# Patient Record
Sex: MALE | Race: BLACK OR AFRICAN AMERICAN | NOT HISPANIC OR LATINO | URBAN - METROPOLITAN AREA
[De-identification: names, ages, dates, MRNs, and addresses within clinical notes are randomized per-mention and may not be internally consistent; named-entity substitution may affect disease eponyms.]

---

## 2024-08-22 ENCOUNTER — HOSPITAL ENCOUNTER (EMERGENCY)
Facility: HOSPITAL | Age: 44
Discharge: HOME | End: 2024-08-22
Attending: EMERGENCY MEDICINE
Payer: COMMERCIAL

## 2024-08-22 VITALS
TEMPERATURE: 98 F | WEIGHT: 220 LBS | HEART RATE: 88 BPM | HEIGHT: 70 IN | OXYGEN SATURATION: 95 % | RESPIRATION RATE: 20 BRPM | SYSTOLIC BLOOD PRESSURE: 149 MMHG | BODY MASS INDEX: 31.5 KG/M2 | DIASTOLIC BLOOD PRESSURE: 91 MMHG

## 2024-08-22 DIAGNOSIS — R73.9 HYPERGLYCEMIA: ICD-10-CM

## 2024-08-22 DIAGNOSIS — E13.65 UNCONTROLLED OTHER SPECIFIED DIABETES MELLITUS WITH HYPERGLYCEMIA: Primary | ICD-10-CM

## 2024-08-22 LAB
ALBUMIN SERPL BCP-MCNC: 3.5 G/DL (ref 3.5–5.2)
ALLENS TEST: ABNORMAL
ALP SERPL-CCNC: 85 U/L (ref 55–135)
ALT SERPL W/O P-5'-P-CCNC: 41 U/L (ref 10–44)
ANION GAP SERPL CALC-SCNC: 10 MMOL/L (ref 8–16)
AST SERPL-CCNC: 18 U/L (ref 10–40)
B-OH-BUTYR BLD STRIP-SCNC: 0.1 MMOL/L (ref 0–0.5)
BACTERIA #/AREA URNS AUTO: NORMAL /HPF
BASOPHILS # BLD AUTO: 0.02 K/UL (ref 0–0.2)
BASOPHILS NFR BLD: 0.3 % (ref 0–1.9)
BILIRUB SERPL-MCNC: 0.7 MG/DL (ref 0.1–1)
BILIRUB UR QL STRIP: NEGATIVE
BUN SERPL-MCNC: 11 MG/DL (ref 6–20)
BUN SERPL-MCNC: 11 MG/DL (ref 6–30)
CALCIUM SERPL-MCNC: 8.8 MG/DL (ref 8.7–10.5)
CHLORIDE SERPL-SCNC: 101 MMOL/L (ref 95–110)
CHLORIDE SERPL-SCNC: 101 MMOL/L (ref 95–110)
CLARITY UR REFRACT.AUTO: CLEAR
CO2 SERPL-SCNC: 21 MMOL/L (ref 23–29)
COLOR UR AUTO: COLORLESS
CREAT SERPL-MCNC: 0.8 MG/DL (ref 0.5–1.4)
CREAT SERPL-MCNC: 1 MG/DL (ref 0.5–1.4)
DIFFERENTIAL METHOD BLD: ABNORMAL
EOSINOPHIL # BLD AUTO: 0.1 K/UL (ref 0–0.5)
EOSINOPHIL NFR BLD: 0.7 % (ref 0–8)
ERYTHROCYTE [DISTWIDTH] IN BLOOD BY AUTOMATED COUNT: 11.9 % (ref 11.5–14.5)
EST. GFR  (NO RACE VARIABLE): >60 ML/MIN/1.73 M^2
GLUCOSE SERPL-MCNC: 377 MG/DL (ref 70–110)
GLUCOSE SERPL-MCNC: 383 MG/DL (ref 70–110)
GLUCOSE UR QL STRIP: ABNORMAL
HCO3 UR-SCNC: 26.1 MMOL/L (ref 24–28)
HCT VFR BLD AUTO: 41.2 % (ref 40–54)
HCT VFR BLD CALC: 42 %PCV (ref 36–54)
HCV AB SERPL QL IA: NORMAL
HGB BLD-MCNC: 13.6 G/DL (ref 14–18)
HGB UR QL STRIP: NEGATIVE
HIV 1+2 AB+HIV1 P24 AG SERPL QL IA: NORMAL
IMM GRANULOCYTES # BLD AUTO: 0.02 K/UL (ref 0–0.04)
IMM GRANULOCYTES NFR BLD AUTO: 0.3 % (ref 0–0.5)
KETONES UR QL STRIP: ABNORMAL
LEUKOCYTE ESTERASE UR QL STRIP: NEGATIVE
LYMPHOCYTES # BLD AUTO: 3.1 K/UL (ref 1–4.8)
LYMPHOCYTES NFR BLD: 40.3 % (ref 18–48)
MCH RBC QN AUTO: 29.6 PG (ref 27–31)
MCHC RBC AUTO-ENTMCNC: 33 G/DL (ref 32–36)
MCV RBC AUTO: 90 FL (ref 82–98)
MICROSCOPIC COMMENT: NORMAL
MONOCYTES # BLD AUTO: 0.6 K/UL (ref 0.3–1)
MONOCYTES NFR BLD: 7.3 % (ref 4–15)
NEUTROPHILS # BLD AUTO: 3.9 K/UL (ref 1.8–7.7)
NEUTROPHILS NFR BLD: 51.1 % (ref 38–73)
NITRITE UR QL STRIP: NEGATIVE
NRBC BLD-RTO: 0 /100 WBC
OHS QRS DURATION: 92 MS
OHS QTC CALCULATION: 440 MS
PCO2 BLDA: 45.3 MMHG (ref 35–45)
PH SMN: 7.37 [PH] (ref 7.35–7.45)
PH UR STRIP: 6 [PH] (ref 5–8)
PLATELET # BLD AUTO: 276 K/UL (ref 150–450)
PMV BLD AUTO: 10.8 FL (ref 9.2–12.9)
PO2 BLDA: 54 MMHG (ref 40–60)
POC BE: 1 MMOL/L
POC IONIZED CALCIUM: 1.21 MMOL/L (ref 1.06–1.42)
POC SATURATED O2: 86 % (ref 95–100)
POC TCO2 (MEASURED): 25 MMOL/L (ref 23–29)
POC TCO2: 27 MMOL/L (ref 24–29)
POCT GLUCOSE: 238 MG/DL (ref 70–110)
POTASSIUM BLD-SCNC: 3.9 MMOL/L (ref 3.5–5.1)
POTASSIUM SERPL-SCNC: 3.8 MMOL/L (ref 3.5–5.1)
PROT SERPL-MCNC: 6.8 G/DL (ref 6–8.4)
PROT UR QL STRIP: NEGATIVE
RBC # BLD AUTO: 4.6 M/UL (ref 4.6–6.2)
SAMPLE: ABNORMAL
SAMPLE: ABNORMAL
SITE: ABNORMAL
SODIUM BLD-SCNC: 134 MMOL/L (ref 136–145)
SODIUM SERPL-SCNC: 132 MMOL/L (ref 136–145)
SP GR UR STRIP: 1.01 (ref 1–1.03)
URN SPEC COLLECT METH UR: ABNORMAL
WBC # BLD AUTO: 7.69 K/UL (ref 3.9–12.7)
YEAST UR QL AUTO: NORMAL

## 2024-08-22 PROCEDURE — 99900035 HC TECH TIME PER 15 MIN (STAT)

## 2024-08-22 PROCEDURE — 93005 ELECTROCARDIOGRAM TRACING: CPT

## 2024-08-22 PROCEDURE — 25000003 PHARM REV CODE 250: Performed by: EMERGENCY MEDICINE

## 2024-08-22 PROCEDURE — 80053 COMPREHEN METABOLIC PANEL: CPT | Performed by: EMERGENCY MEDICINE

## 2024-08-22 PROCEDURE — 81001 URINALYSIS AUTO W/SCOPE: CPT | Performed by: EMERGENCY MEDICINE

## 2024-08-22 PROCEDURE — 87389 HIV-1 AG W/HIV-1&-2 AB AG IA: CPT | Performed by: PHYSICIAN ASSISTANT

## 2024-08-22 PROCEDURE — 99285 EMERGENCY DEPT VISIT HI MDM: CPT | Mod: 25

## 2024-08-22 PROCEDURE — 94761 N-INVAS EAR/PLS OXIMETRY MLT: CPT | Mod: XB

## 2024-08-22 PROCEDURE — 96360 HYDRATION IV INFUSION INIT: CPT

## 2024-08-22 PROCEDURE — 82803 BLOOD GASES ANY COMBINATION: CPT

## 2024-08-22 PROCEDURE — 80047 BASIC METABLC PNL IONIZED CA: CPT

## 2024-08-22 PROCEDURE — 85025 COMPLETE CBC W/AUTO DIFF WBC: CPT | Performed by: EMERGENCY MEDICINE

## 2024-08-22 PROCEDURE — 93010 ELECTROCARDIOGRAM REPORT: CPT | Mod: ,,, | Performed by: INTERNAL MEDICINE

## 2024-08-22 PROCEDURE — 82962 GLUCOSE BLOOD TEST: CPT

## 2024-08-22 PROCEDURE — 86803 HEPATITIS C AB TEST: CPT | Performed by: PHYSICIAN ASSISTANT

## 2024-08-22 PROCEDURE — 82010 KETONE BODYS QUAN: CPT | Performed by: EMERGENCY MEDICINE

## 2024-08-22 RX ADMIN — SODIUM CHLORIDE 1000 ML: 9 INJECTION, SOLUTION INTRAVENOUS at 09:08

## 2024-08-22 NOTE — ED NOTES
Assumed care of the patient. Report received from SOLANGE Horn. Pt on continuous cardiac monitoring, continuous pulse oximetry, and automatic BP cuff cycling Q60min. Pt in hospital gown, side rails up X2, bed low and locked, and call light is placed within reach. One family/visitors at bedside at this time. Pt denies any complaints or needs.          LOC: The patient is awake, alert and aware of environment with an appropriate affect, the patient is oriented x 3 and speaking appropriately.   APPEARANCE: Patient appears comfortable and in no acute distress, patient is clean and well groomed.  SKIN: The skin is warm and dry, color consistent with ethnicity.   MUSCULOSKELETAL: Patient moving all extremities spontaneously, no swelling noted.  RESPIRATORY: Airway is open and patent, respirations are spontaneous, patient has a normal effort and rate, no accessory muscle use noted.  CARDIAC: Patient has a normal rate and regular rhythm, no edema noted, capillary refill < 3 seconds.   GASTRO: Soft and non tender to palpation, no distention noted.   : Pt denies any pain or frequency with urination.  NEURO: Pt opens eyes spontaneously, behavior appropriate to situation, follows commands.

## 2024-08-22 NOTE — ED NOTES
"Patient identifiers for Jacques Banks 43 y.o. male checked and correct.  Chief Complaint   Patient presents with    Hyperglycemia     EMS reports "feeling bad" x 2 weeks/ elevated glucose >500mg/dl     History reviewed. No pertinent past medical history.  Allergies reported: Review of patient's allergies indicates:  Not on File      LOC: Patient is awake, alert, and aware of environment with an appropriate affect. Patient is oriented x 4 and speaking appropriately.  APPEARANCE: Patient resting comfortably and in no acute distress. Patient is clean and well groomed, patient's clothing is properly fastened.  HEENT: WDL  SKIN: The skin is warm and dry. Patient has normal skin turgor and moist mucus membranes.   MUSCULOSKELETAL: Patient is moving all extremities well, no obvious deformities noted. Pulses intact.   RESPIRATORY: Airway is open and patent. Respirations are spontaneous and non-labored with normal effort and rate.  CARDIAC: Patient has a normal rate and rhythm. 85 on cardiac monitor. No peripheral edema noted. Denies chest pain and SOB at at time of assessment.   ABDOMEN: No distention noted. Soft and non-tender upon palpation.  NEUROLOGICAL: pupils 2mm, PERRL. Facial expression is symmetrical. Hand grasps are equal bilaterally. Normal sensation in all extremities when touched with finger.          "

## 2024-08-22 NOTE — Clinical Note
"Jacques "Jacques" Jocelyn was seen and treated in our emergency department on 8/22/2024.  He may return to work on 08/23/2024.       If you have any questions or concerns, please don't hesitate to call.      Vesta Perry MD"

## 2024-08-22 NOTE — ED NOTES
I-STAT Chem-8+ Results:   Value Reference Range   Sodium 134 136-145 mmol/L   Potassium  3.9 3.5-5.1 mmol/L   Chloride 101  mmol/L   Ionized Calcium 1.21 1.06-1.42 mmol/L   CO2 (measured) 25 23-29 mmol/L   Glucose 383  mg/dL   BUN 11 6-30 mg/dL   Creatinine 0.8 0.5-1.4 mg/dL   Hematocrit 42 36-54%

## 2024-08-27 NOTE — ED PROVIDER NOTES
"History:  Jacques Banks is a 43 y.o. male who presents to the ED with Hyperglycemia (EMS reports "feeling bad" x 2 weeks/ elevated glucose >500mg/dl)    Described as 43-year-old male with a history of diabetes on insulin presenting to the emergency department with hyperglycemia.  He reports his blood sugar has been running high with increased thirst and poly urea over the past couple of weeks.  He was told to come to the emergency department as they were concerned he may have DKA.  He denies any nausea or vomiting.  He does report his diet has been poor recently.    Review of Systems: All systems reviewed and are negative except as per history of present illness.    Medications: There are no discharge medications for this patient.      PMH: History reviewed. No pertinent past medical history.  PSH: History reviewed. No pertinent surgical history.  Allergies: He has no allergies on file.  Social History: Marital Status: unknown. He  reports that he has been smoking cigarettes. He has never used smokeless tobacco.. He  has no history on file for alcohol use..       Exam:  VITAL SIGNS:   Vitals:    08/22/24 1017 08/22/24 1105 08/22/24 1122 08/22/24 1202   BP:  (!) 146/88  (!) 149/91   BP Location:  Right arm     Patient Position:  Lying     Pulse: 87 86 87 88   Resp: 18 20 15 20   Temp:   97.5 °F (36.4 °C)    TempSrc:   Oral    SpO2: 96% 95% 96% 95%   Weight:       Height:         Const: Awake and alert, NAD   Head: Atraumatic  Eyes: Normal Conjunctiva  ENT: Normal External Ears, Nose and Mouth.  Neck: Full range of motion. No meningismus.  Resp: Normal respiratory effort, No distress, CTAB  Cardio: Equal and intact distal pulses, RRR  Abd: Soft, non tender, non distended.   Skin: No petechiae or rashes  Ext: No cyanosis, or edema  Neur: Awake and alert  Psych: Normal Mood and Affect    Data:  Results for orders placed or performed during the hospital encounter of 08/22/24   HIV 1/2 Ag/Ab (4th Gen)   Result Value Ref " Range    HIV 1/2 Ag/Ab Non-reactive Non-reactive   Hepatitis C Antibody   Result Value Ref Range    Hepatitis C Ab Non-reactive Non-reactive   CBC auto differential   Result Value Ref Range    WBC 7.69 3.90 - 12.70 K/uL    RBC 4.60 4.60 - 6.20 M/uL    Hemoglobin 13.6 (L) 14.0 - 18.0 g/dL    Hematocrit 41.2 40.0 - 54.0 %    MCV 90 82 - 98 fL    MCH 29.6 27.0 - 31.0 pg    MCHC 33.0 32.0 - 36.0 g/dL    RDW 11.9 11.5 - 14.5 %    Platelets 276 150 - 450 K/uL    MPV 10.8 9.2 - 12.9 fL    Immature Granulocytes 0.3 0.0 - 0.5 %    Gran # (ANC) 3.9 1.8 - 7.7 K/uL    Immature Grans (Abs) 0.02 0.00 - 0.04 K/uL    Lymph # 3.1 1.0 - 4.8 K/uL    Mono # 0.6 0.3 - 1.0 K/uL    Eos # 0.1 0.0 - 0.5 K/uL    Baso # 0.02 0.00 - 0.20 K/uL    nRBC 0 0 /100 WBC    Gran % 51.1 38.0 - 73.0 %    Lymph % 40.3 18.0 - 48.0 %    Mono % 7.3 4.0 - 15.0 %    Eosinophil % 0.7 0.0 - 8.0 %    Basophil % 0.3 0.0 - 1.9 %    Differential Method Automated    Comprehensive metabolic panel   Result Value Ref Range    Sodium 132 (L) 136 - 145 mmol/L    Potassium 3.8 3.5 - 5.1 mmol/L    Chloride 101 95 - 110 mmol/L    CO2 21 (L) 23 - 29 mmol/L    Glucose 377 (H) 70 - 110 mg/dL    BUN 11 6 - 20 mg/dL    Creatinine 1.0 0.5 - 1.4 mg/dL    Calcium 8.8 8.7 - 10.5 mg/dL    Total Protein 6.8 6.0 - 8.4 g/dL    Albumin 3.5 3.5 - 5.2 g/dL    Total Bilirubin 0.7 0.1 - 1.0 mg/dL    Alkaline Phosphatase 85 55 - 135 U/L    AST 18 10 - 40 U/L    ALT 41 10 - 44 U/L    eGFR >60.0 >60 mL/min/1.73 m^2    Anion Gap 10 8 - 16 mmol/L   Beta - Hydroxybutyrate, Serum   Result Value Ref Range    Beta-Hydroxybutyrate 0.1 0.0 - 0.5 mmol/L   Urinalysis, Reflex to Urine Culture Urine, Clean Catch    Specimen: Urine   Result Value Ref Range    Specimen UA Urine, Clean Catch     Color, UA Colorless (A) Yellow, Straw, Tatiana    Appearance, UA Clear Clear    pH, UA 6.0 5.0 - 8.0    Specific Gravity, UA 1.010 1.005 - 1.030    Protein, UA Negative Negative    Glucose, UA 4+ (A) Negative     Ketones, UA 1+ (A) Negative    Bilirubin (UA) Negative Negative    Occult Blood UA Negative Negative    Nitrite, UA Negative Negative    Leukocytes, UA Negative Negative   Urinalysis Microscopic   Result Value Ref Range    Bacteria None None-Occ /hpf    Yeast, UA None None    Microscopic Comment SEE COMMENT    EKG 12-lead   Result Value Ref Range    QRS Duration 92 ms    [facility] QTC Calculation 440 ms   ISTAT PROCEDURE   Result Value Ref Range    POC PH 7.368 7.35 - 7.45    POC PCO2 45.3 (H) 35 - 45 mmHg    POC PO2 54 40 - 60 mmHg    POC HCO3 26.1 24 - 28 mmol/L    POC BE 1 -2 to 2 mmol/L    POC SATURATED O2 86 95 - 100 %    POC TCO2 27 24 - 29 mmol/L    Sample VENOUS     Site Other     Allens Test N/A    ISTAT PROCEDURE   Result Value Ref Range    POC Glucose 383 (H) 70 - 110 mg/dL    POC BUN 11 6 - 30 mg/dL    POC Creatinine 0.8 0.5 - 1.4 mg/dL    POC Sodium 134 (L) 136 - 145 mmol/L    POC Potassium 3.9 3.5 - 5.1 mmol/L    POC Chloride 101 95 - 110 mmol/L    POC TCO2 (MEASURED) 25 23 - 29 mmol/L    POC Ionized Calcium 1.21 1.06 - 1.42 mmol/L    POC Hematocrit 42 36 - 54 %PCV    Sample SCOTT    POCT glucose   Result Value Ref Range    POCT Glucose 238 (H) 70 - 110 mg/dL     Imaging Results              X-Ray Chest AP Portable (Final result)  Result time 08/22/24 10:23:41      Final result by Haseeb Jamison MD (08/22/24 10:23:41)                   Impression:      No significant intrathoracic abnormality.      Electronically signed by: Haseeb Jamison MD  Date:    08/22/2024  Time:    10:23               Narrative:    EXAMINATION:  XR CHEST AP PORTABLE    CLINICAL HISTORY:  hyperglycemia;    TECHNIQUE:  One view    COMPARISON:  No prior chest radiographs are currently available for comparison purposes.  Clinical information obtained from the electronic medical record indicates hyperglycemia.    FINDINGS:  Heart size is normal, as is the appearance of the pulmonary vascularity.  Lung zones are clear, and are free of significant  airspace consolidation or volume loss.  No pleural fluid.  No hilar or mediastinal mass lesion.  No pneumothorax.                                    12-LEAD EKG INTERPRETATION BY ME:  Rate/Rhythm: Normal Sinus Rhythm with rate of 90 beats per minute  QRS, ST, T-waves: Nonspecific St changes inferior leads  Impression:  Nsr, nonspecific St changes    Labs & Imaging studies were reviewed independently by me.     Medical Decision Makin-year-old male presenting to the emergency department with concern for hyperglycemia.  Blood sugar was elevated 377, no signs of DKA.  He was given 1 L of fluids with improvement in glucose to 238.  Workup was otherwise unremarkable, no signs of infection.  He was encouraged to eat a diabetic diet, referred to Endocrinology and diabetic education, continue insulin as prescribed.  Strict return precautions were discussed, and patient and partner are agreeable with the plan.    Clinical Impression:  1. Uncontrolled other specified diabetes mellitus with hyperglycemia    2. Hyperglycemia             Medication List      You have not been prescribed any medications.         Follow-up Information       [facility].    Specialty: Endocrinology  Contact information:  [address] [facility] [address] [phone]  Additional information:  [facility] - Main [facility], 6th Floor   Please park in [facility] and take Clinic elevator             [facility] [address]    Specialty: Diabetes  Contact information:  [address] [facility] [address] [phone]  Additional information:  Endocrinology & Diabetes Specialty Clinic - Main Building, Clinic 6th Floor   Please park in [facility] and take Clinic elevator                             Vesta Perry MD  [phone]

## 2024-09-03 RX ORDER — INSULIN LISPRO 100 [IU]/ML
5 INJECTION, SOLUTION INTRAVENOUS; SUBCUTANEOUS
COMMUNITY
Start: 2024-08-15

## 2024-09-03 RX ORDER — ATORVASTATIN CALCIUM 80 MG/1
1 TABLET, FILM COATED ORAL DAILY
COMMUNITY
Start: 2023-11-30

## 2024-09-03 RX ORDER — INSULIN GLARGINE 100 [IU]/ML
15 INJECTION, SOLUTION SUBCUTANEOUS NIGHTLY
COMMUNITY
Start: 2024-08-15

## 2024-09-03 RX ORDER — PEN NEEDLE, DIABETIC 31 GX5/16"
1 NEEDLE, DISPOSABLE MISCELLANEOUS 4 TIMES DAILY
COMMUNITY
Start: 2024-04-08

## 2024-09-03 RX ORDER — LISINOPRIL 40 MG/1
40 TABLET ORAL DAILY
COMMUNITY

## 2024-09-04 ENCOUNTER — LAB VISIT (OUTPATIENT)
Dept: LAB | Facility: HOSPITAL | Age: 44
End: 2024-09-04
Attending: INTERNAL MEDICINE
Payer: COMMERCIAL

## 2024-09-04 ENCOUNTER — OFFICE VISIT (OUTPATIENT)
Dept: ENDOCRINOLOGY | Facility: CLINIC | Age: 44
End: 2024-09-04
Payer: COMMERCIAL

## 2024-09-04 VITALS
HEART RATE: 85 BPM | BODY MASS INDEX: 29.81 KG/M2 | HEIGHT: 70 IN | DIASTOLIC BLOOD PRESSURE: 80 MMHG | OXYGEN SATURATION: 97 % | WEIGHT: 208.25 LBS | SYSTOLIC BLOOD PRESSURE: 130 MMHG

## 2024-09-04 DIAGNOSIS — R73.9 HYPERGLYCEMIA: ICD-10-CM

## 2024-09-04 DIAGNOSIS — E13.9 DIABETES 1.5, MANAGED AS TYPE 1: ICD-10-CM

## 2024-09-04 DIAGNOSIS — E10.65 TYPE 1 DIABETES MELLITUS WITH HYPERGLYCEMIA: Primary | ICD-10-CM

## 2024-09-04 DIAGNOSIS — I10 HYPERTENSION, UNSPECIFIED TYPE: ICD-10-CM

## 2024-09-04 DIAGNOSIS — E10.65 TYPE 1 DIABETES MELLITUS WITH HYPERGLYCEMIA: ICD-10-CM

## 2024-09-04 DIAGNOSIS — E78.5 HYPERLIPIDEMIA, UNSPECIFIED HYPERLIPIDEMIA TYPE: ICD-10-CM

## 2024-09-04 LAB
ALBUMIN SERPL BCP-MCNC: 3.8 G/DL (ref 3.5–5.2)
ALP SERPL-CCNC: 82 U/L (ref 55–135)
ALT SERPL W/O P-5'-P-CCNC: 28 U/L (ref 10–44)
ANION GAP SERPL CALC-SCNC: 11 MMOL/L (ref 8–16)
AST SERPL-CCNC: 15 U/L (ref 10–40)
BILIRUB SERPL-MCNC: 1.4 MG/DL (ref 0.1–1)
BUN SERPL-MCNC: 13 MG/DL (ref 6–20)
C PEPTIDE SERPL-MCNC: 1.81 NG/ML (ref 0.78–5.19)
CALCIUM SERPL-MCNC: 9.6 MG/DL (ref 8.7–10.5)
CHLORIDE SERPL-SCNC: 103 MMOL/L (ref 95–110)
CO2 SERPL-SCNC: 23 MMOL/L (ref 23–29)
CREAT SERPL-MCNC: 1 MG/DL (ref 0.5–1.4)
EST. GFR  (NO RACE VARIABLE): >60 ML/MIN/1.73 M^2
ESTIMATED AVG GLUCOSE: 258 MG/DL (ref 68–131)
GLUCOSE SERPL-MCNC: 213 MG/DL (ref 70–110)
HBA1C MFR BLD: 10.6 % (ref 4–5.6)
POTASSIUM SERPL-SCNC: 3.6 MMOL/L (ref 3.5–5.1)
PROT SERPL-MCNC: 7.3 G/DL (ref 6–8.4)
SODIUM SERPL-SCNC: 137 MMOL/L (ref 136–145)

## 2024-09-04 PROCEDURE — 36415 COLL VENOUS BLD VENIPUNCTURE: CPT | Performed by: INTERNAL MEDICINE

## 2024-09-04 PROCEDURE — 86341 ISLET CELL ANTIBODY: CPT | Performed by: INTERNAL MEDICINE

## 2024-09-04 PROCEDURE — 80053 COMPREHEN METABOLIC PANEL: CPT | Performed by: INTERNAL MEDICINE

## 2024-09-04 PROCEDURE — 84681 ASSAY OF C-PEPTIDE: CPT | Performed by: INTERNAL MEDICINE

## 2024-09-04 PROCEDURE — 99999 PR PBB SHADOW E&M-EST. PATIENT-LVL IV: CPT | Mod: PBBFAC,,, | Performed by: INTERNAL MEDICINE

## 2024-09-04 PROCEDURE — 83036 HEMOGLOBIN GLYCOSYLATED A1C: CPT | Performed by: INTERNAL MEDICINE

## 2024-09-04 RX ORDER — BLOOD-GLUCOSE SENSOR
1 EACH MISCELLANEOUS
Qty: 2 EACH | Refills: 11 | Status: SHIPPED | OUTPATIENT
Start: 2024-09-04

## 2024-09-04 NOTE — PROGRESS NOTES
"Jacques Banks is a 43 y.o. male with HLD referred by Dr. Vesta Perry for evaluation of diabetes    History of Present Illness  Diabetes- unclear type. He states has been told both type 1 and type 2 in the past  Diagnosed around 2016. He was on metformin initially but did not work well and then started on insulin    Known complications: neuropathy, maybe some retinopathy in the past  DKA in 11/2023. Unclear precipitant except missed insulin  No BRIANNE, c-peptide available at visit    Mom with DM  Used to eat a lot of sweets but improving    No data at time of visit    Has visit with Neris Benavidez in 2 days    Current Diabetes Regimen:  Lantus 15 units daily  Humalog 6-10 units usually but he will adjust based on glucose and what he is eating    Timing of prandial insulin: right before  Omitted doses: maybe a few times a week    Prior mediations tried:  metformin    Diet/Exercise:  Lots of sweets    Recent Hgb A1C:  Lab Results   Component Value Date    HGBA1C 10.5 (H) 11/24/2023       Glucose Monitoring:  Used Symone 2 in past but has not had for at least a month  Deleted haseeb    Hypoglycemic Episodes: denies    Screening / DM Complications:    Nephropathy:  ACEi/ARB: Taking  No results found for: "MICALBCREAT"    Last Lipid Panel:  Statin: Taking  No results found for: "LDLCALC"    Last foot exam Most Recent Foot Exam Date: Not Found  Last eye exam Most Recent Eye Exam Date: Not Found;  no laser surgery or DR    Aspirin: not taking    FIB-4 Calculation: 0.46 at 9/4/2024  1:22 PM   Calculated from:  Last SGOT/AST : 18 at 9/4/2024  2:24 PM  Last SGPT/ALT: 41 at 9/4/2024  1:22 PM   Platelets: 276 at 9/4/2024  2:24 PM   Age: 43 y.o.     FIB-4 below 1.30 is considered as low-risk for advanced fibrosis  FIB-4 over 2.67 is considered as high-risk for advanced fibrosis  FIB-4 values between 1.30 and 2.67 are considered as intermediate-risk of advanced fibrosis for ages 36-64.     For ages > 64 the cut-off for low-risk " "goes to < 2.  This is a screening tool and clinical judgement should be used in the interpretation of these results.                Current Outpatient Medications:     atorvastatin (LIPITOR) 80 MG tablet, Take 1 tablet by mouth once daily., Disp: , Rfl:     HUMALOG KWIKPEN INSULIN 100 unit/mL pen, Inject 5 Units into the skin 3 (three) times daily with meals., Disp: , Rfl:     LANTUS SOLOSTAR U-100 INSULIN 100 unit/mL (3 mL) InPn pen, Inject 15 Units into the skin every evening., Disp: , Rfl:     lisinopriL (PRINIVIL,ZESTRIL) 40 MG tablet, Take 40 mg by mouth once daily., Disp: , Rfl:     TECHLITE PEN NEEDLE 31 gauge x 5/16" Ndle, 1 each by Misc.(Non-Drug; Combo Route) route 4 (four) times daily., Disp: , Rfl:     blood-glucose sensor (FREESTYLE ANAM 3 SENSOR) Suzanne, 1 each by Misc.(Non-Drug; Combo Route) route every 14 (fourteen) days., Disp: 2 each, Rfl: 11    ROS as above    Objective:     Vitals:    09/04/24 1354   BP: 130/80   Pulse: 85     Wt Readings from Last 3 Encounters:   09/04/24 1354 94.4 kg (208 lb 3.6 oz)   08/22/24 0933 99.8 kg (220 lb)     Body mass index is 29.88 kg/m².  Physical Exam  Constitutional:       Appearance: He is well-developed.   HENT:      Head: Normocephalic.   Eyes:      Conjunctiva/sclera: Conjunctivae normal.   Pulmonary:      Effort: Pulmonary effort is normal.   Musculoskeletal:         General: Normal range of motion.   Skin:     General: Skin is warm.      Findings: No rash.   Neurological:      Mental Status: He is alert and oriented to person, place, and time.         Labs    Chemistry        Component Value Date/Time     (L) 08/22/2024 0958    K 3.8 08/22/2024 0958     08/22/2024 0958    CO2 21 (L) 08/22/2024 0958    BUN 11 08/22/2024 0958    CREATININE 1.0 08/22/2024 0958     (H) 08/22/2024 0958        Component Value Date/Time    CALCIUM 8.8 08/22/2024 0958    ALKPHOS 85 08/22/2024 0958    AST 18 08/22/2024 0958    ALT 41 08/22/2024 0958    BILITOT 0.7 " 08/22/2024 0958              Assessment and Plan     Diabetes 1.5, managed as type 1  Unclear etiology of DM although on insulin very early in course and has had admission for DKA suggestive of type 1 although also overweight and admits to poor diet  Will check BRIANNE, c-peptide today to further assess  No data to review to guide insulin changes  Would benefit from resuming CGM, Symone 3 sent  He is interested in pump and may be good option particularly if type 1 confirmed  Does not carb count so would need to learn or could use iLet which may be good starting point for him. Would need to change to Dexcom   Keep upcoming visit with Neris Benavidez    HTN (hypertension)  Cont lisinopril    HLD (hyperlipidemia)  On statin  Fasting lipids before next visit    BMI 29.0-29.9,adult  Can contribute to insulin resistance        RTC 4 months        Carrol Meyer MD    Visit today included increased complexity associated with the care of the problems addressed and managing the longitudinal care of the patient due to the serious and/or complex managed problems

## 2024-09-04 NOTE — PATIENT INSTRUCTIONS
Symone password is [email or web address]!    Download the [employer] 3 haseeb and I will send sensors    Keep education visit

## 2024-09-04 NOTE — ASSESSMENT & PLAN NOTE
Unclear etiology of DM although on insulin very early in course and has had admission for DKA suggestive of type 1 although also overweight and admits to poor diet  Will check BRIANNE, c-peptide today to further assess  No data to review to guide insulin changes  Would benefit from resuming CGM, Symone 3 sent  He is interested in pump and may be good option particularly if type 1 confirmed  Does not carb count so would need to learn or could use iLet which may be good starting point for him. Would need to change to Dexcom   Keep upcoming visit with Neris Benavidez

## 2024-09-06 ENCOUNTER — CLINICAL SUPPORT (OUTPATIENT)
Dept: DIABETES | Facility: CLINIC | Age: 44
End: 2024-09-06
Payer: COMMERCIAL

## 2024-09-06 DIAGNOSIS — E13.9 DIABETES 1.5, MANAGED AS TYPE 1: Primary | ICD-10-CM

## 2024-09-06 DIAGNOSIS — R73.9 HYPERGLYCEMIA: ICD-10-CM

## 2024-09-06 PROCEDURE — 99999 PR PBB SHADOW E&M-EST. PATIENT-LVL I: CPT | Mod: PBBFAC,,,

## 2024-09-06 PROCEDURE — G0108 DIAB MANAGE TRN  PER INDIV: HCPCS | Mod: S$GLB,,,

## 2024-09-09 LAB — GAD65 AB SER-SCNC: 0 NMOL/L

## 2024-09-10 NOTE — PROGRESS NOTES
Diabetes Care Specialist Progress Note  Author: Neris Benavidez RD  Date: 9/10/2024    Intake    Program Intake  Reason for Diabetes Program Visit:: Initial Diabetes Assessment  Current diabetes risk level:: high  In the last 12 months, have you:: used emergency room services  Was the ER or hospital admission related to diabetes?: Yes    Current Diabetes Treatment: Insulin  Method of insulin delivery?: Injections  Injection Type: Pens  Pen Type/Dose: Lantus: 15 units @ night, Humalog, TID    Continuous Glucose Monitoring  Patient has CGM: Yes  Personal CGM type:: Pt is picking up Freestyle Symone 3 today    Lab Results   Component Value Date    HGBA1C 10.6 (H) 09/04/2024       There is no height or weight on file to calculate BMI.    Lifestyle Coping Support & Clinical    Lifestyle/Coping/Support  Learning Barriers:: None  Culture or Confucianism beliefs that may impact ability to access healthcare: No  Psychosocial/Coping Skills Assessment Completed: : No  Deffered due to:: Time  Area of need?: Deferred    Diabetes Self-Management Skills Assessment    Medication Skills Assessment  Patient is able to identify current diabetes medications, dosages, and appropriate timing of medications.: yes  Patient reports problems or concerns with current medication regimen.: no  Patient is  aware that some diabetes medications can cause low blood sugar?: Yes  Medication Skills Assessment Completed:: Yes  Assessment indicates:: Adequate understanding  Area of need?: No    Diabetes Disease Process/Treatment Options  Diabetes Disease Process/Treatment Options: Skills Assessment Completed: Yes  Assessment indicates:: Knowledge deficit, Instruction Needed  Area of need?: Yes    Nutrition/Healthy Eating  Meal Plan 24 Hour Recall - Breakfast: Skips  Meal Plan 24 Hour Recall - Lunch: Big Mac and fries  Meal Plan 24 Hour Recall - Dinner: 1 slice of pizza  Meal Plan 24 Hour Recall - Snack: Wrap, grapefruit, watermelon, Cheez-Its  Meal Plan 24  "Hour Recall - Beverage: Zero sugar cranberry juice  Who shops/cooks?: Pt, pt's significant other  Challenges to healthy eating:: portion control  Nutrition/Healthy Eating Skills Assessment Completed:: Yes  Assessment indicates:: Knowledge deficit, Instruction Needed  Area of need?: Yes    Physical Activity/Exercise  Physical Activity/Exercise Skills Assessment Completed: : No  Deffered due to:: Time  Area of need?: Deferred    Home Blood Glucose Monitoring  Patient states that blood sugar is checked at home daily.: yes  Monitoring Method:: home glucometer, personal continuous glucose monitor  Personal CGM type:: Pt is picking up Freestyle Symone 3 today  Home Blood Glucose Monitoring Skills Assessment Completed: : Yes  Assessment indicates:: Instruction Needed  Area of need?: Yes    Acute Complications  Have you ever had hypoglycemia (low BG 70 or less)?: yes  How do you treat hypoglycemia?: Cookies, "sweet" things  Have you ever had hyperglycemia (high  or more)?: yes  How often and what are your symptoms?: Sleepy  How do you treat hyperglycemia? : Water, insulin, walk  Acute Complications Skills Assessment Completed: : Yes  Assessment indicates:: Adequate understanding  Area of need?: No    Chronic Complications  Reviewed health maintenance: yes  Chronic Complications Skills Assessment Completed: : Yes  Assessment indicates:: Instruction Needed  Area of need?: Yes    Assessment Summary and Plan    Based on today's diabetes care assessment, the following areas of need were identified:          9/6/2024    12:01 AM   Areas of Need   Medications/Current Diabetes Treatment No   Lifestyle Coping Support Deferred   Diabetes Disease Process/Treatment Options Yes, Provided DM management guide and provided instruction regarding signs and symptoms, risk factors of diabetes, increased risk for cardio and cerebral vascular events.  Instructed patient on what is diabetes and the progression of the disease. Discussed " significance of current A1c and blood glucose goals.   Nutrition/Healthy Eating Yes, see care plan.   Physical Activity/Exercise Deferred   Home Blood Glucose Monitoring Yes, pt states will  Freestyle Symone 3 from pharmacy today. Pt states previously on Freestyle symone CGM and can place sensor. Educator encouraged pt to contact educator if questions arise.   Acute Complications No   Chronic Complications Yes, educator reviewed following diabetes shelia maintenance screenings/appointments yearly eye exams, foot exams.      Today's interventions were provided through individual discussion, instruction, and written materials were provided.      Patient verbalized understanding of instruction and written materials.  Pt was able to return back demonstration of instructions today. Patient understood key points, needs reinforcement and further instruction.     Diabetes Self-Management Care Plan:    Today's Diabetes Self-Management Care Plan was developed with Jacques's input. Jacques has agreed to work toward the following goal(s) to improve his/her overall diabetes control.      Care Plan: Diabetes Management   Updates made since 8/11/2024 12:00 AM        Problem: Healthy Eating         Goal: Eat 2-3 meals daily with 45-60g/3-4 servings of Carbohydrate per meal. Limit snacking in between meal to 1 serving (15 grams).    Start Date: 9/6/2024   Expected End Date: 12/10/2024   This Visit's Progress: Deferred   Priority: High   Barriers: Knowledge deficit   Note:    Reviewed meal planning and general nutritional counseling with regards to diabetes management. Meal habits reviewed. Reviewed basic Carbohydrate Counting principles--Food groups, label reading, use of dry measuring cups for accurate portion sizes.       Task: Reviewed the sources and role of Carbohydrate, Protein, and Fat and how each nutrient impacts blood sugar. Completed 9/6/2024        Task: Explained how to count carbohydrates using the food label and  the use of dry measuring cups for accurate carb counting. Completed 9/6/2024        Task: Discussed strategies for choosing healthier menu options when dining out. Completed 9/6/2024        Task: Recommended replacing beverages containing high sugar content with noncaloric/sugar free options and/or water. Completed 9/6/2024        Task: Review the importance of balancing carbohydrates with each meal using portion control techniques to count servings of carbohydrate and label reading to identify serving size and amount of total carbs per serving. Completed 9/6/2024        Follow Up Plan     Follow up in about 4 weeks (around 10/4/2024) for 1-month F/U, Symone download.    Today's care plan and follow up schedule was discussed with patient.  Jacques verbalized understanding of the care plan, goals, and agrees to follow up plan.        The patient was encouraged to communicate with his/her health care provider/physician and care team regarding his/her condition(s) and treatment.  I provided the patient with my contact information today and encouraged to contact me via phone or [facility]'s Patient Portal as needed.     Length of Visit   Total Time: 45 Minutes

## 2025-01-16 NOTE — PROGRESS NOTES
"Jacques Banks is a 44 y.o. male with HLD presenting for follow-up of T2DM    History of Present Illness  Diabetes- unclear type. He states has been told both type 1 and type 2 in the past  Diagnosed around 2016. He was on metformin initially but did not work well and then started on insulin    At last visit, c-peptide and BRIANNE completed with results more consistent with T2DM.  Symone 3 prescription sent and discussed patients interest in insulin pump.  Patient meet Diabetes Education ([employer]) discussing diet, CGM, risk of DM, and health maintenance with DM diagnosis.  Not wearing Symone today.  Fell off while at work and has not picked up Rx.  Has been doing SMBG about every other day with BG running 140 - 220.  Reports feeling bad when BG high reporting fatigue.      C-peptide:  1.81 w/.  BRIANNE: 0.00    Known complications: neuropathy, maybe some retinopathy in the past  DKA in 11/2023. Unclear precipitant except missed insulin  No BRIANNE, c-peptide available at visit  UTD with eye exam, no laser surgery or DR  Foot exam done today 1/2025    Mom with DM  Used to eat a lot of sweets but improving    No data at time of visit    Current Diabetes Regimen:  Lantus 15 units daily  Humalog 6-10 units - since not monitoring BG regularly adjust dosage based on meal size and composition.     Timing of prandial insulin: right before  Omitted doses: maybe a few times a week    Prior mediations tried:  Metformin - Stopped when started insulin  Trulicity - Possible Pancreatitis    Diet/Exercise:  Lots of sweets    Recent Hgb A1C:  Lab Results   Component Value Date    HGBA1C 10.6 (H) 09/04/2024       Glucose Monitoring:  See Above    Hypoglycemic Episodes: States once weekly and is symptomatic.  Does not verify since he is at work when it occurs.    Screening / DM Complications:    Nephropathy:  ACEi/ARB: Taking - Lisinopril 40 mg  No results found for: "MICALBCREAT"    Last Lipid Panel:  Statin: Taking - Atorvastatin 80 mg  No " "results found for: "LDLCALC"    Aspirin: not taking    FIB-4 Calculation: 0.46 at 9/4/2024  1:22 PM   Calculated from:  15  Last SGPT/ALT: 41 at 9/4/2024  1:22 PM   342   Age: 44 y.o.     FIB-4 below 1.30 is considered as low-risk for advanced fibrosis  FIB-4 over 2.67 is considered as high-risk for advanced fibrosis  FIB-4 values between 1.30 and 2.67 are considered as intermediate-risk of advanced fibrosis for ages 36-64.     For ages > 64 the cut-off for low-risk goes to < 2.  This is a screening tool and clinical judgement should be used in the interpretation of these results.      Current Outpatient Medications:     atorvastatin (LIPITOR) 80 MG tablet, Take 1 tablet by mouth once daily., Disp: , Rfl:     fenofibrate (TRICOR) 54 MG tablet, Take 54 mg by mouth once daily., Disp: , Rfl:     HUMALOG KWIKPEN INSULIN 100 unit/mL pen, Inject 5 Units into the skin 3 (three) times daily with meals., Disp: , Rfl:     hydrOXYzine (ATARAX) 50 MG tablet, Take 50 mg by mouth 2 (two) times daily as needed., Disp: , Rfl:     LANTUS SOLOSTAR U-100 INSULIN 100 unit/mL (3 mL) InPn pen, Inject 15 Units into the skin every evening., Disp: , Rfl:     lisinopriL (PRINIVIL,ZESTRIL) 40 MG tablet, Take 40 mg by mouth once daily., Disp: , Rfl:     TECHLITE PEN NEEDLE 31 gauge x 5/16" Ndle, 1 each by Misc.(Non-Drug; Combo Route) route 4 (four) times daily., Disp: , Rfl:     blood-glucose sensor (DEXCOM G7 SENSOR) Suzanne, USe to test glucose daily as directed.Change sensor every 10 (ten) days., Disp: 3 each, Rfl: 11    metFORMIN (GLUCOPHAGE-XR) 500 MG ER 24hr tablet, Take 1 tablet (500 mg total) by mouth 2 (two) times daily with meals., Disp: 180 tablet, Rfl: 3    sulfamethoxazole-trimethoprim 800-160mg (BACTRIM DS) 800-160 mg Tab, Take 1 tablet by mouth 2 (two) times daily. for 7 days, Disp: 14 tablet, Rfl: 0    ROS as above    Objective:     Vitals:    01/17/25 0851   BP: (!) 132/92       Wt Readings from Last 3 Encounters:   01/17/25 1117 " 98.4 kg (217 lb)   01/17/25 0851 98.8 kg (217 lb 13 oz)   09/04/24 1354 94.4 kg (208 lb 3.6 oz)     Body mass index is 31.25 kg/m².  Physical Exam  Constitutional:       Appearance: He is well-developed.   HENT:      Head: Normocephalic.   Eyes:      Conjunctiva/sclera: Conjunctivae normal.   Pulmonary:      Effort: Pulmonary effort is normal.   Musculoskeletal:         General: Normal range of motion.   Skin:     General: Skin is warm.      Findings: Abscess present.          Neurological:      Mental Status: He is alert and oriented to person, place, and time.       Protective Sensation (w/ 10 gram monofilament):  Right: Intact  Left: Intact    Visual Inspection:  Normal -  Bilateral    Pedal Pulses:   Right: Present  Left: Present      Labs    Chemistry        Component Value Date/Time     01/17/2025 1306    K 4.0 01/17/2025 1306     01/17/2025 1306    CO2 23 01/17/2025 1306    BUN 13 01/17/2025 1306    CREATININE 0.9 01/17/2025 1306     (H) 01/17/2025 1306        Component Value Date/Time    CALCIUM 9.2 01/17/2025 1306    ALKPHOS 70 01/17/2025 1306    AST 15 01/17/2025 1306    ALT 25 01/17/2025 1306    BILITOT 1.0 01/17/2025 1306              Assessment and Plan     BMI 29.0-29.9,adult  Can contribute to insulin resistance  May consider GLP1 in future after thorough review of medical records.    HTN (hypertension)  Cont lisinopril    HLD (hyperlipidemia)  On statin  Fasting lipids before next visit    Type 2 diabetes mellitus, without long-term current use of insulin  Unclear etiology of DM although on insulin very early in course and has had admission for DKA in 2023 suggestive of type 1. C-peptide and BRIANNE done more suggesting T2DM etiology.  No data to review to guide insulin changes  Continue current insulin regimen  -- Lantus 15 units daily  -- Humalog 6-10 units - since not monitoring BG regularly adjust dosage based on meal size and composition.   Would benefit from resuming CGM, Symone 3  sent to [facility] with instruction on how to get best results from device.  He is interested in pump and may be good option based on history and insulin requirements  Does not carb count so would need to learn or could use iLet which may be good starting point for him. Would need to change to Dexcom   Recent DE appointment with completed with Neris Benavidez.  Encouraged to reach out for follow-up.  Will consider Jardiance with caution due to hx of groin abscess and history of DKA diagnosis at next visit  Will get release of information for [facility] to further evaluate cause of pancreatitis.  If indicated pancreatitis not cause by GLP1 will consider Mounjaro in the future for pancreatic beta cell protection as C - peptide low-normal with elevated BG.  Indurated hard, tender, non-erythematous abscess noted to R groin at today's visit.  Encouraged patient to seek out treatment from PCP or Urgent care.  Educated patient on the increased risk for infection and delayed wound healing with diabetes diagnosis.       RTC 4 months      Carrol Meyer MD      Case discussed with Dr. Meyer  Recommendations were discussed with the patient in detail  The patient verbalized understanding and agrees with the plan outlined as above.       Visit today included increased complexity associated with the care of the problems addressed and managing the longitudinal care of the patient due to the serious and/or complex managed problems

## 2025-01-16 NOTE — ASSESSMENT & PLAN NOTE
Can contribute to insulin resistance  May consider GLP1 in future after thorough review of medical records.

## 2025-01-16 NOTE — ASSESSMENT & PLAN NOTE
Unclear etiology of DM although on insulin very early in course and has had admission for DKA suggestive of type 1 although also overweight and admits to poor diet  Will check BRIANNE, c-peptide today to further assess  No data to review to guide insulin changes  Would benefit from resuming CGM, Symone 3 sent  He is interested in pump and may be good option particularly if type 1 confirmed  Does not carb count so would need to learn or could use iLet which may be good starting point for him. Would need to change to Dexcom   Ghada RAMOS appointment with [facility] Benavidez.  Encouraged to reach out for follow-up.    Will consider Jardiance at next vist if groin abscess cleared    Indurated hard, tender, non-erythematous abscess noted to R groin.  Encouraged patient to seek out treatment from PCP or Urgent care.  Educated patient on the increased risk for infection and delayed wound healing with diabetes diagnosis.

## 2025-01-17 ENCOUNTER — OFFICE VISIT (OUTPATIENT)
Dept: ENDOCRINOLOGY | Facility: CLINIC | Age: 45
End: 2025-01-17
Payer: COMMERCIAL

## 2025-01-17 ENCOUNTER — PATIENT MESSAGE (OUTPATIENT)
Dept: ENDOCRINOLOGY | Facility: CLINIC | Age: 45
End: 2025-01-17

## 2025-01-17 ENCOUNTER — HOSPITAL ENCOUNTER (EMERGENCY)
Facility: HOSPITAL | Age: 45
Discharge: HOME | End: 2025-01-17
Attending: EMERGENCY MEDICINE
Payer: COMMERCIAL

## 2025-01-17 VITALS
WEIGHT: 217 LBS | BODY MASS INDEX: 31.07 KG/M2 | DIASTOLIC BLOOD PRESSURE: 90 MMHG | HEIGHT: 70 IN | OXYGEN SATURATION: 97 % | TEMPERATURE: 98 F | RESPIRATION RATE: 18 BRPM | HEART RATE: 85 BPM | SYSTOLIC BLOOD PRESSURE: 134 MMHG

## 2025-01-17 VITALS
DIASTOLIC BLOOD PRESSURE: 92 MMHG | WEIGHT: 217.81 LBS | SYSTOLIC BLOOD PRESSURE: 132 MMHG | HEIGHT: 70 IN | BODY MASS INDEX: 31.18 KG/M2

## 2025-01-17 DIAGNOSIS — L03.90 CELLULITIS, UNSPECIFIED CELLULITIS SITE: Primary | ICD-10-CM

## 2025-01-17 DIAGNOSIS — E11.69 TYPE 2 DIABETES MELLITUS WITH OTHER SPECIFIED COMPLICATION, WITHOUT LONG-TERM CURRENT USE OF INSULIN: Primary | ICD-10-CM

## 2025-01-17 DIAGNOSIS — I10 HYPERTENSION, UNSPECIFIED TYPE: ICD-10-CM

## 2025-01-17 DIAGNOSIS — E78.5 HYPERLIPIDEMIA, UNSPECIFIED HYPERLIPIDEMIA TYPE: ICD-10-CM

## 2025-01-17 PROBLEM — E11.9 TYPE 2 DIABETES MELLITUS, WITHOUT LONG-TERM CURRENT USE OF INSULIN: Status: ACTIVE | Noted: 2025-01-17

## 2025-01-17 LAB
ALBUMIN SERPL BCP-MCNC: 3.9 G/DL (ref 3.5–5.2)
ALP SERPL-CCNC: 70 U/L (ref 40–150)
ALT SERPL W/O P-5'-P-CCNC: 25 U/L (ref 10–44)
ANION GAP SERPL CALC-SCNC: 9 MMOL/L (ref 8–16)
AST SERPL-CCNC: 15 U/L (ref 10–40)
BACTERIA #/AREA URNS AUTO: NORMAL /HPF
BASOPHILS # BLD AUTO: 0.03 K/UL (ref 0–0.2)
BASOPHILS NFR BLD: 0.3 % (ref 0–1.9)
BILIRUB SERPL-MCNC: 1 MG/DL (ref 0.1–1)
BILIRUB UR QL STRIP: NEGATIVE
BUN SERPL-MCNC: 13 MG/DL (ref 6–20)
CALCIUM SERPL-MCNC: 9.2 MG/DL (ref 8.7–10.5)
CHLORIDE SERPL-SCNC: 104 MMOL/L (ref 95–110)
CLARITY UR REFRACT.AUTO: CLEAR
CO2 SERPL-SCNC: 23 MMOL/L (ref 23–29)
COLOR UR AUTO: YELLOW
CREAT SERPL-MCNC: 0.9 MG/DL (ref 0.5–1.4)
DIFFERENTIAL METHOD BLD: NORMAL
EOSINOPHIL # BLD AUTO: 0.1 K/UL (ref 0–0.5)
EOSINOPHIL NFR BLD: 1 % (ref 0–8)
ERYTHROCYTE [DISTWIDTH] IN BLOOD BY AUTOMATED COUNT: 11.9 % (ref 11.5–14.5)
EST. GFR  (NO RACE VARIABLE): >60 ML/MIN/1.73 M^2
GLUCOSE SERPL-MCNC: 309 MG/DL (ref 70–110)
GLUCOSE UR QL STRIP: ABNORMAL
HCT VFR BLD AUTO: 43.1 % (ref 40–54)
HGB BLD-MCNC: 14.1 G/DL (ref 14–18)
HGB UR QL STRIP: NEGATIVE
IMM GRANULOCYTES # BLD AUTO: 0.01 K/UL (ref 0–0.04)
IMM GRANULOCYTES NFR BLD AUTO: 0.1 % (ref 0–0.5)
KETONES UR QL STRIP: NEGATIVE
LEUKOCYTE ESTERASE UR QL STRIP: NEGATIVE
LYMPHOCYTES # BLD AUTO: 3.7 K/UL (ref 1–4.8)
LYMPHOCYTES NFR BLD: 41.2 % (ref 18–48)
MCH RBC QN AUTO: 29.7 PG (ref 27–31)
MCHC RBC AUTO-ENTMCNC: 32.7 G/DL (ref 32–36)
MCV RBC AUTO: 91 FL (ref 82–98)
MICROSCOPIC COMMENT: NORMAL
MONOCYTES # BLD AUTO: 0.5 K/UL (ref 0.3–1)
MONOCYTES NFR BLD: 6.1 % (ref 4–15)
NEUTROPHILS # BLD AUTO: 4.5 K/UL (ref 1.8–7.7)
NEUTROPHILS NFR BLD: 51.3 % (ref 38–73)
NITRITE UR QL STRIP: NEGATIVE
NRBC BLD-RTO: 0 /100 WBC
PH UR STRIP: 6 [PH] (ref 5–8)
PLATELET # BLD AUTO: 342 K/UL (ref 150–450)
PMV BLD AUTO: 10.6 FL (ref 9.2–12.9)
POCT GLUCOSE: 438 MG/DL (ref 70–110)
POTASSIUM SERPL-SCNC: 4 MMOL/L (ref 3.5–5.1)
PROT SERPL-MCNC: 7.5 G/DL (ref 6–8.4)
PROT UR QL STRIP: NEGATIVE
RBC # BLD AUTO: 4.74 M/UL (ref 4.6–6.2)
RBC #/AREA URNS AUTO: 1 /HPF (ref 0–4)
SODIUM SERPL-SCNC: 136 MMOL/L (ref 136–145)
SP GR UR STRIP: >=1.03 (ref 1–1.03)
SQUAMOUS #/AREA URNS AUTO: 0 /HPF
URN SPEC COLLECT METH UR: ABNORMAL
WBC # BLD AUTO: 8.86 K/UL (ref 3.9–12.7)
WBC #/AREA URNS AUTO: 0 /HPF (ref 0–5)
YEAST UR QL AUTO: NORMAL

## 2025-01-17 PROCEDURE — 99284 EMERGENCY DEPT VISIT MOD MDM: CPT | Mod: 25

## 2025-01-17 PROCEDURE — 3008F BODY MASS INDEX DOCD: CPT | Mod: CPTII,S$GLB,, | Performed by: INTERNAL MEDICINE

## 2025-01-17 PROCEDURE — 85025 COMPLETE CBC W/AUTO DIFF WBC: CPT

## 2025-01-17 PROCEDURE — G2211 COMPLEX E/M VISIT ADD ON: HCPCS | Mod: S$GLB,,, | Performed by: INTERNAL MEDICINE

## 2025-01-17 PROCEDURE — 25000003 PHARM REV CODE 250

## 2025-01-17 PROCEDURE — 63600175 PHARM REV CODE 636 W HCPCS: Mod: TB

## 2025-01-17 PROCEDURE — 1159F MED LIST DOCD IN RCRD: CPT | Mod: CPTII,S$GLB,, | Performed by: INTERNAL MEDICINE

## 2025-01-17 PROCEDURE — 1160F RVW MEDS BY RX/DR IN RCRD: CPT | Mod: CPTII,S$GLB,, | Performed by: INTERNAL MEDICINE

## 2025-01-17 PROCEDURE — 99214 OFFICE O/P EST MOD 30 MIN: CPT | Mod: S$GLB,,, | Performed by: INTERNAL MEDICINE

## 2025-01-17 PROCEDURE — 80053 COMPREHEN METABOLIC PANEL: CPT

## 2025-01-17 PROCEDURE — 3080F DIAST BP >= 90 MM HG: CPT | Mod: CPTII,S$GLB,, | Performed by: INTERNAL MEDICINE

## 2025-01-17 PROCEDURE — 81001 URINALYSIS AUTO W/SCOPE: CPT

## 2025-01-17 PROCEDURE — 82962 GLUCOSE BLOOD TEST: CPT

## 2025-01-17 PROCEDURE — 99999 PR PBB SHADOW E&M-EST. PATIENT-LVL III: CPT | Mod: PBBFAC,,, | Performed by: INTERNAL MEDICINE

## 2025-01-17 PROCEDURE — 96374 THER/PROPH/DIAG INJ IV PUSH: CPT

## 2025-01-17 PROCEDURE — 3075F SYST BP GE 130 - 139MM HG: CPT | Mod: CPTII,S$GLB,, | Performed by: INTERNAL MEDICINE

## 2025-01-17 RX ORDER — KETOROLAC TROMETHAMINE 30 MG/ML
15 INJECTION, SOLUTION INTRAMUSCULAR; INTRAVENOUS
Status: COMPLETED | OUTPATIENT
Start: 2025-01-17 | End: 2025-01-17

## 2025-01-17 RX ORDER — BLOOD-GLUCOSE SENSOR
1 EACH MISCELLANEOUS
Qty: 3 EACH | Refills: 11 | Status: SHIPPED | OUTPATIENT
Start: 2025-01-17 | End: 2026-01-17

## 2025-01-17 RX ORDER — SULFAMETHOXAZOLE AND TRIMETHOPRIM 800; 160 MG/1; MG/1
1 TABLET ORAL
Status: COMPLETED | OUTPATIENT
Start: 2025-01-17 | End: 2025-01-17

## 2025-01-17 RX ORDER — METFORMIN HYDROCHLORIDE 500 MG/1
500 TABLET, EXTENDED RELEASE ORAL 2 TIMES DAILY WITH MEALS
Qty: 180 TABLET | Refills: 3 | Status: SHIPPED | OUTPATIENT
Start: 2025-01-17 | End: 2026-01-17

## 2025-01-17 RX ORDER — FENOFIBRATE 54 MG/1
54 TABLET ORAL DAILY
COMMUNITY
Start: 2024-12-19

## 2025-01-17 RX ORDER — SULFAMETHOXAZOLE AND TRIMETHOPRIM 800; 160 MG/1; MG/1
1 TABLET ORAL 2 TIMES DAILY
Qty: 14 TABLET | Refills: 0 | Status: SHIPPED | OUTPATIENT
Start: 2025-01-17 | End: 2025-01-24

## 2025-01-17 RX ORDER — HYDROXYZINE HYDROCHLORIDE 50 MG/1
50 TABLET, FILM COATED ORAL 2 TIMES DAILY PRN
COMMUNITY
Start: 2024-09-04

## 2025-01-17 RX ADMIN — KETOROLAC TROMETHAMINE 15 MG: 30 INJECTION, SOLUTION INTRAMUSCULAR; INTRAVENOUS at 01:01

## 2025-01-17 RX ADMIN — SULFAMETHOXAZOLE AND TRIMETHOPRIM 1 TABLET: 800; 160 TABLET ORAL at 01:01

## 2025-01-17 NOTE — ED PROVIDER NOTES
Encounter Date: 1/17/2025       History     Chief Complaint   Patient presents with    Cyst     Pt arrived to ED via pov from home with complaint of cyst in groin area. Pt states that it has gotten larger over the last few days and is causing some discomfort. Pt has hx of T2D.      44-year-old male with a history of type 1 diabetes and hyperlipidemia presents with a chief complaint of groin abscess.  The patient says that he has had swelling and pain in his right groin since Monday.  He says that the area opened up and drained but he is still having pain.  He is denying any dysuria.  He does say he is sexually active with 1 partner that is not new.  He denies any fevers, vomiting.  He says he has been taking all of his medications as prescribed.  He [facility].    The history is provided by the patient. No  was used.     Review of patient's allergies indicates:  No Known Allergies  Past Medical History:   Diagnosis Date    Diabetes mellitus type I     Hyperlipidemia      History reviewed. No pertinent surgical history.  Family History   Problem Relation Name Age of Onset    Diabetes Mother       Social History     Tobacco Use    Smoking status: Some Days     Types: Cigarettes    Smokeless tobacco: Never   Substance Use Topics    Drug use: Never     Review of Systems    Physical Exam     Initial Vitals [01/17/25 1117]   BP Pulse Resp Temp SpO2   (!) 179/89 95 16 97.9 °F (36.6 °C) 97 %      MAP       --         Physical Exam    Nursing note and vitals reviewed.  Constitutional: He appears well-developed and well-nourished. He is not diaphoretic. No distress.   HENT:   Head: Normocephalic.   Cardiovascular:  Normal rate, regular rhythm, normal heart sounds and intact distal pulses.           Pulmonary/Chest: Breath sounds normal. He has no wheezes. He has no rhonchi. He has no rales.   Abdominal: Abdomen is soft. There is no abdominal tenderness. There is no rebound and  no guarding.   Genitourinary:    Genitourinary Comments: There is a focal area of induration and redness immediately superior to the inguinal ligament, there is no scrotal tenderness or epididymal tenderness, pocus did not demonstrate any drainable fluid collection, there was no crepitus on palpation or dirty shadowing on pocus       Skin: Skin is warm. Capillary refill takes less than 2 seconds. Rash noted. There is erythema.         ED Course   Procedures  Labs Reviewed   COMPREHENSIVE METABOLIC PANEL - Abnormal       Result Value    Sodium 136      Potassium 4.0      Chloride 104      CO2 23      Glucose 309 (*)     BUN 13      Creatinine 0.9      Calcium 9.2      Total Protein 7.5      Albumin 3.9      Total Bilirubin 1.0      Alkaline Phosphatase 70      AST 15      ALT 25      eGFR >60.0      Anion Gap 9     URINALYSIS, REFLEX TO URINE CULTURE - Abnormal    Specimen UA Urine, Clean Catch      Color, UA Yellow      Appearance, UA Clear      pH, UA 6.0      Specific Gravity, UA >=1.030 (*)     Protein, UA Negative      Glucose, UA 4+ (*)     Ketones, UA Negative      Bilirubin (UA) Negative      Occult Blood UA Negative      Nitrite, UA Negative      Leukocytes, UA Negative      Narrative:     Specimen Source->Urine   POCT GLUCOSE - Abnormal    POCT Glucose 438 (*)    CBC W/ AUTO DIFFERENTIAL    WBC 8.86      RBC 4.74      Hemoglobin 14.1      Hematocrit 43.1      MCV 91      MCH 29.7      MCHC 32.7      RDW 11.9      Platelets 342      MPV 10.6      Immature Granulocytes 0.1      Gran # (ANC) 4.5      Immature Grans (Abs) 0.01      Lymph # 3.7      Mono # 0.5      Eos # 0.1      Baso # 0.03      nRBC 0      Gran % 51.3      Lymph % 41.2      Mono % 6.1      Eosinophil % 1.0      Basophil % 0.3      Differential Method Automated     URINALYSIS MICROSCOPIC    RBC, UA 1      WBC, UA 0      Bacteria None      Yeast, UA None      Squam Epithel, UA 0      Microscopic Comment SEE COMMENT      Narrative:     Specimen  Source->Urine          Imaging Results    None          Medications   sulfamethoxazole-trimethoprim 800-160mg per tablet 1 tablet (1 tablet Oral Given 1/17/25 1321)   ketorolac injection 15 mg (15 mg Intravenous Given 1/17/25 1322)     Medical Decision Making  See ED course for remainder of care    Amount and/or Complexity of Data Reviewed  Labs: ordered. Decision-making details documented in ED Course.    Risk  Prescription drug management.              Attending Attestation:   Physician Attestation Statement for Resident:  As the supervising MD   Physician Attestation Statement: I have personally seen and examined this patient.   I agree with the above history.  -:   As the supervising MD I agree with the above PE.     As the supervising MD I agree with the above treatment, course, plan, and disposition.                    ED Course as of 01/19/25 0037 Fri Jan 17, 2025   1402 44-year-old male in no acute distress.  Patient is mildly hypertensive, vital signs are otherwise within normal limits.  Presentation is most consistent with simple cellulitis, there was no crepitus or triggers shadowing on exam to suggest necrotizing infection, but considered.  Patient does not have any leukocytosis and patient has asymptomatic hyperglycemia.  I counseled him on the importance of controlling his blood sugar on the resolution of his infection.  We will prescribe a 7 day course of Bactrim.  I discussed strict return precautions with them and answered all his questions.  I provided him with discharge paperwork and he was discharged in stable condition. [BP]   1410 RBC, UA: 1 [BP]   1410 WBC, UA: 0 [BP]   1410 Bacteria, UA: None [BP]      ED Course User Index  [BP] Maninder Reece MD                           Clinical Impression:  Final diagnoses:  [L03.90] Cellulitis, unspecified cellulitis site (Primary)          ED Disposition Condition    Discharge Stable          ED Prescriptions       Medication Sig Dispense Start Date  End Date Auth. Provider    sulfamethoxazole-trimethoprim 800-160mg (BACTRIM DS) 800-160 mg Tab Take 1 tablet by mouth 2 (two) times daily. for 7 days 14 tablet 1/17/2025 1/24/2025 Maninder Reece MD          Follow-up Information       Follow up With Specialties Details Why Contact Info    [facility] - Emergency Dept Emergency Medicine  As needed, If symptoms worsen [id number] [facility] [id number] [phone]             Maninder Reece MD  Resident  01/17/25 1412       Katherine Ochoa MD  01/19/25 0038

## 2025-01-17 NOTE — ED NOTES
Patient is a 44 year old male who presents to the ED for complaints of a cyst to his R groin that is painful. First noticed the bump on Monday. Endorses seeing fluid leak from the site. Endorses pain. Seen at endocrine clinic this AM and encouraged to go to the ED for further evaluation. Denies all other complaints.

## 2025-01-17 NOTE — ASSESSMENT & PLAN NOTE
Unclear etiology of DM although on insulin very early in course and has had admission for DKA in 2023 suggestive of type 1. C-peptide and BRIANNE done more suggesting T2DM etiology.  No data to review to guide insulin changes  Continue current insulin regimen  -- Lantus 15 units daily  -- Humalog 6-10 units - since not monitoring BG regularly adjust dosage based on meal size and composition.   Would benefit from resuming CGM, Symone 3 sent to [facility] with instruction on how to get best results from device.  He is interested in pump and may be good option based on history and insulin requirements  Does not carb count so would need to learn or could use iLet which may be good starting point for him. Would need to change to foc.us   Recent DE appointment with completed with Neris Benavidez.  Encouraged to reach out for follow-up.  Will consider Jardiance with caution due to hx of groin abscess and history of DKA diagnosis at next visit  Will get release of information for [facility] to further evaluate cause of pancreatitis.  If indicated pancreatitis not cause by GLP1 will consider Mounjaro in the future for pancreatic beta cell protection as C - peptide low-normal with elevated BG.  Indurated hard, tender, non-erythematous abscess noted to R groin at today's visit.  Encouraged patient to seek out treatment from PCP or Urgent care.  Educated patient on the increased risk for infection and delayed wound healing with diabetes diagnosis.

## 2025-01-17 NOTE — DISCHARGE INSTRUCTIONS
Diagnosis: Cellulitis     Cellulitis is a skin infection.     Tests today showed:   Labs Reviewed   POCT GLUCOSE - Abnormal       Result Value    POCT Glucose 438 (*)    CBC W/ AUTO DIFFERENTIAL   COMPREHENSIVE METABOLIC PANEL   URINALYSIS, REFLEX TO URINE CULTURE   POCT GLUCOSE MONITORING CONTINUOUS     Imaging Results    None         Treatments you had today:   Medications   sulfamethoxazole-trimethoprim 800-160mg per tablet 1 tablet (has no administration in time range)   ketorolac injection 15 mg (has no administration in time range)       Home Care Instructions:  - Take the prescribed antibiotic as directed.   - make sure that you continue to check your blood sugar and take her diabetes medications  - return to the emergency department if your rashes getting worse, you develop fever, nausea with vomiting or any other concerning symptoms    Follow-up plan:  · Follow-up with: Primary care doctor within 3 - 5  days  · Follow-up for additional testing and/or evaluation as directed by your primary doctor    Return to the emergency department if you develop significant pain or swelling at the site of your cellulitis infection, if you have fevers, nausea, vomiting, or diarrhea, or if the redness of your skin is moving beyond the area where it is red today. These could be signs of worsening infection requiring further medical care.     If your infection was outlined today, return to the emergency department if the redness extends beyond the area outlined by the pen.

## 2025-01-17 NOTE — Clinical Note
Morning,  I saw Mr. Banks in clinic today.  He mentioned that at his DE follow-up visit he was out of town and would like to reschedule.  Can you please follow-up.  Thanks.

## 2025-01-24 PROBLEM — E13.9 DIABETES 1.5, MANAGED AS TYPE 1: Status: RESOLVED | Noted: 2024-09-04 | Resolved: 2025-01-24

## 2025-01-31 ENCOUNTER — CLINICAL SUPPORT (OUTPATIENT)
Dept: DIABETES | Facility: CLINIC | Age: 45
End: 2025-01-31
Payer: COMMERCIAL

## 2025-01-31 DIAGNOSIS — E13.9 DIABETES 1.5, MANAGED AS TYPE 1: Primary | ICD-10-CM

## 2025-01-31 PROCEDURE — 99999 PR PBB SHADOW E&M-EST. PATIENT-LVL I: CPT | Mod: PBBFAC,,,

## 2025-01-31 PROCEDURE — G0108 DIAB MANAGE TRN  PER INDIV: HCPCS | Mod: S$GLB,,,

## 2025-01-31 RX ORDER — LISINOPRIL 40 MG/1
40 TABLET ORAL DAILY
Qty: 90 TABLET | Refills: 3 | Status: SHIPPED | OUTPATIENT
Start: 2025-01-31

## 2025-02-01 ENCOUNTER — LAB VISIT (OUTPATIENT)
Dept: LAB | Facility: HOSPITAL | Age: 45
End: 2025-02-01
Payer: COMMERCIAL

## 2025-02-01 DIAGNOSIS — E78.5 HYPERLIPIDEMIA, UNSPECIFIED HYPERLIPIDEMIA TYPE: ICD-10-CM

## 2025-02-01 DIAGNOSIS — E11.69 TYPE 2 DIABETES MELLITUS WITH OTHER SPECIFIED COMPLICATION, WITHOUT LONG-TERM CURRENT USE OF INSULIN: ICD-10-CM

## 2025-02-01 LAB
ALBUMIN SERPL BCP-MCNC: 4 G/DL (ref 3.5–5.2)
ALP SERPL-CCNC: 70 U/L (ref 40–150)
ALT SERPL W/O P-5'-P-CCNC: 23 U/L (ref 10–44)
ANION GAP SERPL CALC-SCNC: 7 MMOL/L (ref 8–16)
AST SERPL-CCNC: 12 U/L (ref 10–40)
BILIRUB SERPL-MCNC: 0.7 MG/DL (ref 0.1–1)
BUN SERPL-MCNC: 13 MG/DL (ref 6–20)
CALCIUM SERPL-MCNC: 9 MG/DL (ref 8.7–10.5)
CHLORIDE SERPL-SCNC: 105 MMOL/L (ref 95–110)
CHOLEST SERPL-MCNC: 124 MG/DL (ref 120–199)
CHOLEST/HDLC SERPL: 3.4 {RATIO} (ref 2–5)
CO2 SERPL-SCNC: 23 MMOL/L (ref 23–29)
CREAT SERPL-MCNC: 1 MG/DL (ref 0.5–1.4)
EST. GFR  (NO RACE VARIABLE): >60 ML/MIN/1.73 M^2
ESTIMATED AVG GLUCOSE: 212 MG/DL (ref 68–131)
GLUCOSE SERPL-MCNC: 211 MG/DL (ref 70–110)
HBA1C MFR BLD: 9 % (ref 4–5.6)
HDLC SERPL-MCNC: 36 MG/DL (ref 40–75)
HDLC SERPL: 29 % (ref 20–50)
LDLC SERPL CALC-MCNC: 54 MG/DL (ref 63–159)
NONHDLC SERPL-MCNC: 88 MG/DL
POTASSIUM SERPL-SCNC: 3.8 MMOL/L (ref 3.5–5.1)
PROT SERPL-MCNC: 7.2 G/DL (ref 6–8.4)
SODIUM SERPL-SCNC: 135 MMOL/L (ref 136–145)
TRIGL SERPL-MCNC: 170 MG/DL (ref 30–150)

## 2025-02-01 PROCEDURE — 36415 COLL VENOUS BLD VENIPUNCTURE: CPT

## 2025-02-01 PROCEDURE — 80061 LIPID PANEL: CPT

## 2025-02-01 PROCEDURE — 83036 HEMOGLOBIN GLYCOSYLATED A1C: CPT

## 2025-02-01 PROCEDURE — 80053 COMPREHEN METABOLIC PANEL: CPT

## 2025-02-04 ENCOUNTER — TELEPHONE (OUTPATIENT)
Dept: ENDOCRINOLOGY | Facility: CLINIC | Age: 45
End: 2025-02-04
Payer: COMMERCIAL

## 2025-02-04 NOTE — TELEPHONE ENCOUNTER
----- Message from Emeli sent at 2/4/2025  8:08 AM CST -----  Regarding: pt advice  Contact: [phone]  Pt is requesting a call from someone in the office and is asking for a return call back soon. Thanks.     Reason for call:discuss plan of care for Rx states sugar is dropping low      Patient's DX:     Patient requesting call back or [facility] msg: JORGE [phone]

## 2025-02-04 NOTE — TELEPHONE ENCOUNTER
Hi, you saw this pt a couple of weeks ago. He just recently started using a dexcom so I put a report in media for you to review. Pt c/o glucose dropping too low but I'm not really seeing that on his dexcom other than one time. Please advise.

## 2025-02-04 NOTE — TELEPHONE ENCOUNTER
Spoke with patient after reviewing case with Dr. Meyer.  After review of Dexcom and speaking with patient medication changes made below in attempt to avoid postprandial hypoglycemia.  Patient verbalized understanding of medication changes made.    Medication Changes:  Continue Metformin  BID  Increase Lantus 18 units daily  Decrease Humalog 6-8 units based on meal size and composition.

## 2025-02-13 ENCOUNTER — OFFICE VISIT (OUTPATIENT)
Dept: INTERNAL MEDICINE | Facility: CLINIC | Age: 45
End: 2025-02-13
Payer: COMMERCIAL

## 2025-02-13 ENCOUNTER — IMMUNIZATION (OUTPATIENT)
Dept: INTERNAL MEDICINE | Facility: CLINIC | Age: 45
End: 2025-02-13
Payer: COMMERCIAL

## 2025-02-13 ENCOUNTER — PATIENT OUTREACH (OUTPATIENT)
Dept: ADMINISTRATIVE | Facility: HOSPITAL | Age: 45
End: 2025-02-13
Payer: COMMERCIAL

## 2025-02-13 VITALS
BODY MASS INDEX: 31.59 KG/M2 | HEART RATE: 86 BPM | DIASTOLIC BLOOD PRESSURE: 98 MMHG | OXYGEN SATURATION: 98 % | SYSTOLIC BLOOD PRESSURE: 146 MMHG | WEIGHT: 220.69 LBS | HEIGHT: 70 IN

## 2025-02-13 DIAGNOSIS — I10 PRIMARY HYPERTENSION: ICD-10-CM

## 2025-02-13 DIAGNOSIS — Z23 NEED FOR VACCINATION: ICD-10-CM

## 2025-02-13 DIAGNOSIS — K86.9 PANCREATIC LESION: ICD-10-CM

## 2025-02-13 DIAGNOSIS — Z12.5 ENCOUNTER FOR SCREENING FOR MALIGNANT NEOPLASM OF PROSTATE: ICD-10-CM

## 2025-02-13 DIAGNOSIS — E27.9 ADRENAL NODULE: ICD-10-CM

## 2025-02-13 DIAGNOSIS — Z00.00 ENCOUNTER FOR ANNUAL GENERAL MEDICAL EXAMINATION WITHOUT ABNORMAL FINDINGS IN ADULT: Primary | ICD-10-CM

## 2025-02-13 DIAGNOSIS — Z79.4 TYPE 2 DIABETES MELLITUS WITHOUT COMPLICATION, WITH LONG-TERM CURRENT USE OF INSULIN: ICD-10-CM

## 2025-02-13 DIAGNOSIS — N52.9 ERECTILE DYSFUNCTION, UNSPECIFIED ERECTILE DYSFUNCTION TYPE: ICD-10-CM

## 2025-02-13 DIAGNOSIS — Z76.89 ENCOUNTER TO ESTABLISH CARE WITH NEW DOCTOR: ICD-10-CM

## 2025-02-13 DIAGNOSIS — Z12.11 ENCOUNTER FOR SCREENING FOR MALIGNANT NEOPLASM OF COLON: ICD-10-CM

## 2025-02-13 DIAGNOSIS — F17.200 TOBACCO DEPENDENCE: ICD-10-CM

## 2025-02-13 DIAGNOSIS — E78.2 MIXED HYPERLIPIDEMIA: ICD-10-CM

## 2025-02-13 DIAGNOSIS — Z23 NEED FOR VACCINATION: Primary | ICD-10-CM

## 2025-02-13 DIAGNOSIS — E66.09 CLASS 1 OBESITY DUE TO EXCESS CALORIES WITH SERIOUS COMORBIDITY AND BODY MASS INDEX (BMI) OF 31.0 TO 31.9 IN ADULT: ICD-10-CM

## 2025-02-13 DIAGNOSIS — E11.9 TYPE 2 DIABETES MELLITUS WITHOUT COMPLICATION, WITH LONG-TERM CURRENT USE OF INSULIN: ICD-10-CM

## 2025-02-13 DIAGNOSIS — E66.811 CLASS 1 OBESITY DUE TO EXCESS CALORIES WITH SERIOUS COMORBIDITY AND BODY MASS INDEX (BMI) OF 31.0 TO 31.9 IN ADULT: ICD-10-CM

## 2025-02-13 PROBLEM — E11.10 DKA, TYPE 2, NOT AT GOAL: Status: RESOLVED | Noted: 2023-11-25 | Resolved: 2025-02-13

## 2025-02-13 PROBLEM — K76.0 HEPATIC STEATOSIS: Status: ACTIVE | Noted: 2023-11-28

## 2025-02-13 PROBLEM — E78.1 HYPERTRIGLYCERIDEMIA: Status: ACTIVE | Noted: 2023-11-28

## 2025-02-13 PROBLEM — K85.90 ACUTE PANCREATITIS: Status: RESOLVED | Noted: 2023-11-28 | Resolved: 2025-02-13

## 2025-02-13 PROBLEM — K56.7 ILEUS: Status: RESOLVED | Noted: 2023-11-28 | Resolved: 2025-02-13

## 2025-02-13 PROBLEM — B99.9 INTRA-ABDOMINAL INFECTION: Status: RESOLVED | Noted: 2023-11-25 | Resolved: 2025-02-13

## 2025-02-13 PROCEDURE — 1159F MED LIST DOCD IN RCRD: CPT | Mod: CPTII,S$GLB,, | Performed by: FAMILY MEDICINE

## 2025-02-13 PROCEDURE — 3080F DIAST BP >= 90 MM HG: CPT | Mod: CPTII,S$GLB,, | Performed by: FAMILY MEDICINE

## 2025-02-13 PROCEDURE — 90661 CCIIV3 VAC ABX FR 0.5 ML IM: CPT | Mod: S$GLB,,, | Performed by: INTERNAL MEDICINE

## 2025-02-13 PROCEDURE — 90471 IMMUNIZATION ADMIN: CPT | Mod: S$GLB,,, | Performed by: INTERNAL MEDICINE

## 2025-02-13 PROCEDURE — 4010F ACE/ARB THERAPY RXD/TAKEN: CPT | Mod: CPTII,S$GLB,, | Performed by: FAMILY MEDICINE

## 2025-02-13 PROCEDURE — 99999 PR PBB SHADOW E&M-EST. PATIENT-LVL IV: CPT | Mod: PBBFAC,,, | Performed by: FAMILY MEDICINE

## 2025-02-13 PROCEDURE — 1160F RVW MEDS BY RX/DR IN RCRD: CPT | Mod: CPTII,S$GLB,, | Performed by: FAMILY MEDICINE

## 2025-02-13 PROCEDURE — 3008F BODY MASS INDEX DOCD: CPT | Mod: CPTII,S$GLB,, | Performed by: FAMILY MEDICINE

## 2025-02-13 PROCEDURE — 3052F HG A1C>EQUAL 8.0%<EQUAL 9.0%: CPT | Mod: CPTII,S$GLB,, | Performed by: FAMILY MEDICINE

## 2025-02-13 PROCEDURE — 99386 PREV VISIT NEW AGE 40-64: CPT | Mod: S$GLB,,, | Performed by: FAMILY MEDICINE

## 2025-02-13 PROCEDURE — 3077F SYST BP >= 140 MM HG: CPT | Mod: CPTII,S$GLB,, | Performed by: FAMILY MEDICINE

## 2025-02-13 RX ORDER — INSULIN LISPRO 100 [IU]/ML
5 INJECTION, SOLUTION INTRAVENOUS; SUBCUTANEOUS
Qty: 13.5 ML | Refills: 1 | Status: SHIPPED | OUTPATIENT
Start: 2025-02-13 | End: 2025-08-12

## 2025-02-13 RX ORDER — IBUPROFEN 200 MG
1 TABLET ORAL DAILY
Qty: 90 PATCH | Refills: 3 | Status: SHIPPED | OUTPATIENT
Start: 2025-02-13 | End: 2026-02-13

## 2025-02-13 RX ORDER — INSULIN GLARGINE 100 [IU]/ML
18 INJECTION, SOLUTION SUBCUTANEOUS NIGHTLY
Qty: 16.2 ML | Refills: 1 | Status: SHIPPED | OUTPATIENT
Start: 2025-02-13 | End: 2025-08-12

## 2025-02-13 RX ORDER — SILDENAFIL 100 MG/1
100 TABLET, FILM COATED ORAL DAILY PRN
Qty: 18 TABLET | Refills: 2 | Status: SHIPPED | OUTPATIENT
Start: 2025-02-13

## 2025-02-13 NOTE — PROGRESS NOTES
Subjective:     Patient ID: Jacques Banks is a 44 y.o. male.   Chief Complaint: Establish Care    HPI:  History of Present Illness    CHIEF COMPLAINT:  Patient presents today for a checkup and establishment of care    He reports blood sugar fluctuations with levels typically ranging between 150-200. He experiences occasional hypoglycemic episodes with glucose dropping to 50, and hyperglycemic episodes exceeding 250 during the day. He sees an endocrinologist as part of his diabetic care. He is also due to see a nutritionist for dietary monitoring. He has an appointment for a diabetic eye exam later today.    CT Scan showed an adrenal nodule in 2023, which has not had follow-up imaging yet. A pancreatic lesion was also noted as part of this study.      He is a current smoker attempting to quit using nicotine patches, which he reports being out of currently. He consumes alcohol, reporting 6-8 drinks on Fridays only.    Colon cancer screening completed last year via stool sample testing. Labs were completed two weeks ago.       Review of Problems & History:  Patient Active Problem List   Diagnosis    Primary hypertension    Hypertriglyceridemia    Type 2 diabetes mellitus without complication, with long-term current use of insulin    Adrenal nodule    Class 1 obesity with body mass index (BMI) of 31.0 to 31.9 in adult    Hepatic steatosis    Pancreatic lesion    Erectile dysfunction    Tobacco dependence      Past Medical History:   Diagnosis Date    Acute pancreatitis 11/28/2023    Diabetes mellitus type I     DKA, type 2, not at goal 11/25/2023    Hyperlipidemia     Ileus 11/28/2023    Intra-abdominal infection 11/25/2023      History reviewed. No pertinent surgical history.   Social History     Socioeconomic History    Marital status: Unknown   Tobacco Use    Smoking status: Some Days     Types: Cigarettes    Smokeless tobacco: Never    Tobacco comments:     Trying to quit, using patches   Substance and Sexual  Activity    Alcohol use: Yes     Alcohol/week: 6.0 - 8.0 standard drinks of alcohol     Types: 6 - 8 Standard drinks or equivalent per week    Drug use: Never    Sexual activity: Yes     Partners: Female     Social Drivers of Health     Financial Resource Strain: High Risk (9/6/2024)    Overall Financial Resource Strain (CARDIA)     Difficulty of Paying Living Expenses: Hard   Food Insecurity: Food Insecurity Present (9/6/2024)    Hunger Vital Sign     Worried About Running Out of Food in the Last Year: Sometimes true     Ran Out of Food in the Last Year: Sometimes true   Transportation Needs: No Transportation Needs (11/27/2023)    [facility]    PRAPARE - Transportation     Lack of Transportation (Medical): No     Lack of Transportation (Non-Medical): No   Physical Activity: Unknown (9/6/2024)    Exercise Vital Sign     Days of Exercise per Week: 5 days   Stress: Stress Concern Present (9/6/2024)    [employer] [place] [employer] - Occupational Stress Questionnaire     Feeling of Stress : Very much   Housing Stability: High Risk (9/6/2024)    Housing Stability Vital Sign     Unable to Pay for Housing in the Last Year: Yes        Medication Review:    Current Outpatient Medications:     atorvastatin (LIPITOR) 80 MG tablet, Take 1 tablet by mouth once daily., Disp: , Rfl:     blood-glucose sensor (DEXCOM G7 SENSOR) Suzanne, USe to test glucose daily as directed.Change sensor every 10 (ten) days., Disp: 3 each, Rfl: 11    fenofibrate (TRICOR) 54 MG tablet, Take 54 mg by mouth once daily., Disp: , Rfl:     hydrOXYzine (ATARAX) 50 MG tablet, Take 50 mg by mouth 2 (two) times daily as needed., Disp: , Rfl:     lisinopriL (PRINIVIL,ZESTRIL) 40 MG tablet, Take 1 tablet (40 mg total) by mouth once daily., Disp: 90 tablet, Rfl: 3    metFORMIN (GLUCOPHAGE-XR) 500 MG ER 24hr tablet, Take 1 tablet (500 mg total) by mouth 2 (two) times daily with meals., Disp: 180 tablet, Rfl:  "3    TECHLITE PEN NEEDLE 31 gauge x 5/16" Ndle, 1 each by Misc.(Non-Drug; Combo Route) route 4 (four) times daily., Disp: , Rfl:     HUMALOG KWIKPEN INSULIN 100 unit/mL pen, Inject 5 Units into the skin 3 (three) times daily with meals., Disp: 13.5 mL, Rfl: 1    LANTUS SOLOSTAR U-100 INSULIN 100 unit/mL (3 mL) InPn pen, Inject 18 Units into the skin every evening., Disp: 16.2 mL, Rfl: 1    nicotine (NICODERM CQ) 21 mg/24 hr, Place 1 patch onto the skin once daily., Disp: 90 patch, Rfl: 3    sildenafiL (VIAGRA) 100 MG tablet, Take 1 tablet (100 mg total) by mouth daily as needed for Erectile Dysfunction., Disp: 18 tablet, Rfl: 2     Review of Systems   Constitutional:  Negative for chills, fatigue and fever.   HENT:  Negative for nasal congestion, ear pain, postnasal drip and sore throat.    Eyes:  Negative for visual disturbance.   Respiratory:  Negative for cough, shortness of breath and wheezing.    Cardiovascular:  Negative for chest pain and palpitations.   Gastrointestinal:  Negative for abdominal pain, change in bowel habit, constipation, diarrhea, nausea and vomiting.   Genitourinary:  Positive for erectile dysfunction. Negative for dysuria and hematuria.   Musculoskeletal:  Negative for back pain, leg pain and neck pain.   Neurological:  Negative for dizziness, numbness and headaches.   Hematological:  Negative for adenopathy.   Psychiatric/Behavioral:  Negative for dysphoric mood, sleep disturbance and suicidal ideas. The patient is not nervous/anxious.           Objective:      Vitals:    02/13/25 0908   BP: (!) 146/98   BP Location: Left arm   Patient Position: Sitting   Pulse: 86   SpO2: 98%   Weight: 100.1 kg (220 lb 10.9 oz)   Height: 5' 10" (1.778 m)      Physical Exam  Vitals reviewed.   Constitutional:       General: He is not in acute distress.     Appearance: Normal appearance. He is obese. He is not ill-appearing.   HENT:      Head: Normocephalic and atraumatic.      Right Ear: Tympanic membrane, " ear canal and external ear normal. There is no impacted cerumen.      Left Ear: Tympanic membrane, ear canal and external ear normal. There is no impacted cerumen.      Nose: Nose normal. No congestion or rhinorrhea.      Mouth/Throat:      Mouth: Mucous membranes are moist.      Pharynx: Oropharynx is clear. No oropharyngeal exudate or posterior oropharyngeal erythema.   Eyes:      General: No scleral icterus.        Right eye: No discharge.         Left eye: No discharge.      Conjunctiva/sclera: Conjunctivae normal.   Neck:      Thyroid: No thyroid mass, thyromegaly or thyroid tenderness.   Cardiovascular:      Rate and Rhythm: Normal rate and regular rhythm.      Pulses: Normal pulses.      Heart sounds: Normal heart sounds. No murmur heard.     No friction rub. No gallop.   Pulmonary:      Effort: Pulmonary effort is normal. No respiratory distress.      Breath sounds: Normal breath sounds. No stridor. No wheezing, rhonchi or rales.   Abdominal:      General: Abdomen is flat. Bowel sounds are normal. There is no distension.      Palpations: Abdomen is soft. There is no mass.      Tenderness: There is no abdominal tenderness. There is no guarding.      Hernia: No hernia is present.   Musculoskeletal:         General: No swelling or deformity. Normal range of motion.      Cervical back: Normal range of motion and neck supple. No tenderness.   Lymphadenopathy:      Cervical: No cervical adenopathy.   Skin:     General: Skin is warm and dry.   Neurological:      General: No focal deficit present.      Mental Status: He is alert and oriented to person, place, and time. Mental status is at baseline.      Gait: Gait normal.      Deep Tendon Reflexes: Reflexes normal.   Psychiatric:         Mood and Affect: Mood normal.         Behavior: Behavior normal.         Thought Content: Thought content normal.         Judgment: Judgment normal.           Assessment/Plan:             ICD-10-CM ICD-9-CM   1. Encounter for annual  general medical examination without abnormal findings in adult  Z00.00 V70.0   2. Encounter to establish care with new doctor  Z76.89 V65.8   3. Class 1 obesity due to excess calories with serious comorbidity and body mass index (BMI) of 31.0 to 31.9 in adult  E66.811 278.00    E66.09 V85.31    Z68.31    4. Primary hypertension  I10 401.9   5. Need for vaccination  Z23 V05.9   6. Encounter for screening for malignant neoplasm of colon  Z12.11 V76.51   7. Encounter for screening for malignant neoplasm of prostate  Z12.5 V76.44   8. Type 2 diabetes mellitus without complication, with long-term current use of insulin  E11.9 250.00    Z79.4 V58.67   9. Mixed hyperlipidemia  E78.2 272.2   10. Adrenal nodule  E27.9 255.9   11. Pancreatic lesion  K86.9 577.9   12. Erectile dysfunction, unspecified erectile dysfunction type  N52.9 607.84   13. Tobacco dependence  F17.200 305.1     Orders Placed This Encounter   Procedures    CT Abdomen Pelvis W Wo Contrast    CBC Auto Differential    TSH    T4, Free    PSA, Screening       Assessment & Plan    - Reviewed patient's medical history, including adrenal nodule and pancreatic lesion discovered in 2023 scan  - Assessed current medication regimen  - Evaluated recent lab results, noting improvement in A1C levels  - Considered additional tests to complete health assessment    1. Encounter for annual general medical examination without abnormal findings in adult (Primary)  Health maintenance updated  Chronic issues reviewed  Additional nonfasting labs ordered  Prior labs reviewed today with pt  - CBC Auto Differential; Future  - TSH; Future  - T4, Free; Future    2. Encounter to establish care with new doctor  Reviewed chronic medical conditions, updated problem list and medication list    3. Class 1 obesity due to excess calories with serious comorbidity and body mass index (BMI) of 31.0 to 31.9 in adult  Encourage healthy diet and exercise habits to optimize health and minimize  chance of comorbidity development    4. Primary hypertension  Not at goal today  Recently changed to lisinopril 40mg from 20mg  Consider further adjustments to HTN meds at next visit    5. Need for vaccination  Influenza administered in clinic    6. Encounter for screening for malignant neoplasm of colon  Obtaining records for   Will order additional screening if indicated    7. Encounter for screening for malignant neoplasm of prostate  - PSA, Screening; Future    8. Type 2 diabetes mellitus without complication, with long-term current use of insulin  Above goal but A1c trending in right direction  Continue f/u w/ endocrinology  Refill Lantus 18u qhs and Humalog 5u TIDWM  Continue metformin XR 500mg BID, no refill needed today  Eye exam to be completed today  - HUMALOG KWIKPEN INSULIN 100 unit/mL pen; Inject 5 Units into the skin 3 (three) times daily with meals.  Dispense: 13.5 mL; Refill: 1  - LANTUS SOLOSTAR U-100 INSULIN 100 unit/mL (3 mL) InPn pen; Inject 18 Units into the skin every evening.  Dispense: 16.2 mL; Refill: 1    9. Mixed hyperlipidemia  At goal on recent FLP  Continue atorvastatin 80mg, fenofibrate 54mg    10. Adrenal nodule  Reviewed prior study  New CT ordered for f/u  - CT Abdomen Pelvis W Wo Contrast; Future    11. Pancreatic lesion  Reviewed prior study  New CT ordered for f/u  - CT Abdomen Pelvis W Wo Contrast; Future    12. Erectile dysfunction, unspecified erectile dysfunction type  Refill viagra at pt request  - sildenafiL (VIAGRA) 100 MG tablet; Take 1 tablet (100 mg total) by mouth daily as needed for Erectile Dysfunction.  Dispense: 18 tablet; Refill: 2    13. Tobacco dependence  Refill nicotine patches per pt request  - nicotine (NICODERM CQ) 21 mg/24 hr; Place 1 patch onto the skin once daily.  Dispense: 90 patch; Refill: 3         Follow up in about 6 months (around 8/13/2025).     David Ignacio MD, FAAFP  Family Medicine Physician  [facility]  02/13/2025    This note was generated with the assistance of ambient listening technology. Verbal consent was obtained by the patient and accompanying visitor(s) for the recording of patient appointment to facilitate this note. I attest to having reviewed and edited the generated note for accuracy, though some syntax or spelling errors may persist. Please contact the author of this note for any clarification.

## 2025-02-19 DIAGNOSIS — E11.9 TYPE 2 DIABETES MELLITUS WITHOUT COMPLICATION: ICD-10-CM

## 2025-02-26 DIAGNOSIS — E11.9 TYPE 2 DIABETES MELLITUS WITHOUT COMPLICATION, UNSPECIFIED WHETHER LONG TERM INSULIN USE: ICD-10-CM

## 2025-05-14 DIAGNOSIS — E11.9 TYPE 2 DIABETES MELLITUS WITHOUT COMPLICATION: ICD-10-CM

## 2025-06-04 ENCOUNTER — HOSPITAL ENCOUNTER (OUTPATIENT)
Dept: RADIOLOGY | Facility: OTHER | Age: 45
Discharge: HOME | End: 2025-06-04
Attending: FAMILY MEDICINE
Payer: COMMERCIAL

## 2025-06-04 ENCOUNTER — OFFICE VISIT (OUTPATIENT)
Dept: ENDOCRINOLOGY | Facility: CLINIC | Age: 45
End: 2025-06-04
Payer: COMMERCIAL

## 2025-06-04 VITALS
HEIGHT: 70 IN | WEIGHT: 216.5 LBS | DIASTOLIC BLOOD PRESSURE: 82 MMHG | BODY MASS INDEX: 30.99 KG/M2 | SYSTOLIC BLOOD PRESSURE: 116 MMHG

## 2025-06-04 DIAGNOSIS — E66.09 CLASS 1 OBESITY DUE TO EXCESS CALORIES WITH SERIOUS COMORBIDITY AND BODY MASS INDEX (BMI) OF 31.0 TO 31.9 IN ADULT: Chronic | ICD-10-CM

## 2025-06-04 DIAGNOSIS — Z01.89 ENCOUNTER FOR ROUTINE LABORATORY TESTING: Primary | ICD-10-CM

## 2025-06-04 DIAGNOSIS — E11.9 TYPE 2 DIABETES MELLITUS WITHOUT COMPLICATION, WITH LONG-TERM CURRENT USE OF INSULIN: Primary | ICD-10-CM

## 2025-06-04 DIAGNOSIS — Z79.4 TYPE 2 DIABETES MELLITUS WITHOUT COMPLICATION, WITH LONG-TERM CURRENT USE OF INSULIN: Primary | ICD-10-CM

## 2025-06-04 DIAGNOSIS — E66.811 CLASS 1 OBESITY DUE TO EXCESS CALORIES WITH SERIOUS COMORBIDITY AND BODY MASS INDEX (BMI) OF 31.0 TO 31.9 IN ADULT: Chronic | ICD-10-CM

## 2025-06-04 DIAGNOSIS — K86.9 PANCREATIC LESION: ICD-10-CM

## 2025-06-04 DIAGNOSIS — E27.9 ADRENAL NODULE: ICD-10-CM

## 2025-06-04 PROCEDURE — 4010F ACE/ARB THERAPY RXD/TAKEN: CPT | Mod: CPTII,S$GLB,, | Performed by: INTERNAL MEDICINE

## 2025-06-04 PROCEDURE — 99214 OFFICE O/P EST MOD 30 MIN: CPT | Mod: S$GLB,,, | Performed by: INTERNAL MEDICINE

## 2025-06-04 PROCEDURE — 3079F DIAST BP 80-89 MM HG: CPT | Mod: CPTII,S$GLB,, | Performed by: INTERNAL MEDICINE

## 2025-06-04 PROCEDURE — 1160F RVW MEDS BY RX/DR IN RCRD: CPT | Mod: CPTII,S$GLB,, | Performed by: INTERNAL MEDICINE

## 2025-06-04 PROCEDURE — 1159F MED LIST DOCD IN RCRD: CPT | Mod: CPTII,S$GLB,, | Performed by: INTERNAL MEDICINE

## 2025-06-04 PROCEDURE — 99999 PR PBB SHADOW E&M-EST. PATIENT-LVL IV: CPT | Mod: PBBFAC,,, | Performed by: INTERNAL MEDICINE

## 2025-06-04 PROCEDURE — 3074F SYST BP LT 130 MM HG: CPT | Mod: CPTII,S$GLB,, | Performed by: INTERNAL MEDICINE

## 2025-06-04 PROCEDURE — 3052F HG A1C>EQUAL 8.0%<EQUAL 9.0%: CPT | Mod: CPTII,S$GLB,, | Performed by: INTERNAL MEDICINE

## 2025-06-04 PROCEDURE — G2211 COMPLEX E/M VISIT ADD ON: HCPCS | Mod: S$GLB,,, | Performed by: INTERNAL MEDICINE

## 2025-06-04 PROCEDURE — 3008F BODY MASS INDEX DOCD: CPT | Mod: CPTII,S$GLB,, | Performed by: INTERNAL MEDICINE

## 2025-06-04 RX ORDER — PEN NEEDLE, DIABETIC 30 GX3/16"
1 NEEDLE, DISPOSABLE MISCELLANEOUS 4 TIMES DAILY
Qty: 200 EACH | Refills: 11 | Status: SHIPPED | OUTPATIENT
Start: 2025-06-04

## 2025-06-04 RX ORDER — BLOOD-GLUCOSE SENSOR
1 EACH MISCELLANEOUS
COMMUNITY

## 2025-06-04 RX ORDER — INSULIN LISPRO 100 [IU]/ML
6 INJECTION, SOLUTION INTRAVENOUS; SUBCUTANEOUS
Qty: 16.2 ML | Refills: 3 | Status: SHIPPED | OUTPATIENT
Start: 2025-06-04 | End: 2025-12-01

## 2025-06-04 RX ORDER — INSULIN GLARGINE 100 [IU]/ML
20 INJECTION, SOLUTION SUBCUTANEOUS NIGHTLY
Qty: 18 ML | Refills: 3 | Status: SHIPPED | OUTPATIENT
Start: 2025-06-04 | End: 2025-12-01

## 2025-06-04 RX ORDER — TIRZEPATIDE 2.5 MG/.5ML
2.5 INJECTION, SOLUTION SUBCUTANEOUS
Qty: 2 ML | Refills: 5 | Status: SHIPPED | OUTPATIENT
Start: 2025-06-04

## 2025-06-05 ENCOUNTER — RESULTS FOLLOW-UP (OUTPATIENT)
Dept: ENDOCRINOLOGY | Facility: CLINIC | Age: 45
End: 2025-06-05

## 2025-06-07 ENCOUNTER — LAB VISIT (OUTPATIENT)
Dept: LAB | Facility: HOSPITAL | Age: 45
End: 2025-06-07
Payer: COMMERCIAL

## 2025-06-07 DIAGNOSIS — Z12.5 ENCOUNTER FOR SCREENING FOR MALIGNANT NEOPLASM OF PROSTATE: ICD-10-CM

## 2025-06-07 DIAGNOSIS — Z01.89 ENCOUNTER FOR ROUTINE LABORATORY TESTING: ICD-10-CM

## 2025-06-07 DIAGNOSIS — Z00.00 ENCOUNTER FOR ANNUAL GENERAL MEDICAL EXAMINATION WITHOUT ABNORMAL FINDINGS IN ADULT: ICD-10-CM

## 2025-06-07 DIAGNOSIS — E11.9 TYPE 2 DIABETES MELLITUS WITHOUT COMPLICATION: ICD-10-CM

## 2025-06-07 LAB
ABSOLUTE EOSINOPHIL (OHS): 0.07 K/UL
ABSOLUTE MONOCYTE (OHS): 0.48 K/UL (ref 0.3–1)
ABSOLUTE NEUTROPHIL COUNT (OHS): 3.34 K/UL (ref 1.8–7.7)
ALBUMIN SERPL BCP-MCNC: 4.4 G/DL (ref 3.5–5.2)
ALBUMIN/CREAT UR: 23.3 UG/MG
ALP SERPL-CCNC: 63 UNIT/L (ref 40–150)
ALT SERPL W/O P-5'-P-CCNC: 26 UNIT/L (ref 10–44)
ANION GAP (OHS): 11 MMOL/L (ref 8–16)
AST SERPL-CCNC: 15 UNIT/L (ref 11–45)
BASOPHILS # BLD AUTO: 0.03 K/UL
BASOPHILS NFR BLD AUTO: 0.4 %
BILIRUB SERPL-MCNC: 0.4 MG/DL (ref 0.1–1)
BUN SERPL-MCNC: 14 MG/DL (ref 6–20)
CALCIUM SERPL-MCNC: 9.1 MG/DL (ref 8.7–10.5)
CHLORIDE SERPL-SCNC: 102 MMOL/L (ref 95–110)
CO2 SERPL-SCNC: 19 MMOL/L (ref 23–29)
CREAT SERPL-MCNC: 0.8 MG/DL (ref 0.5–1.4)
CREAT UR-MCNC: 129 MG/DL (ref 23–375)
EAG (OHS): 192 MG/DL (ref 68–131)
ERYTHROCYTE [DISTWIDTH] IN BLOOD BY AUTOMATED COUNT: 12 % (ref 11.5–14.5)
GFR SERPLBLD CREATININE-BSD FMLA CKD-EPI: >60 ML/MIN/1.73/M2
GLUCOSE SERPL-MCNC: 243 MG/DL (ref 70–110)
HBA1C MFR BLD: 8.3 % (ref 4–5.6)
HCT VFR BLD AUTO: 42.4 % (ref 40–54)
HGB BLD-MCNC: 13.8 GM/DL (ref 14–18)
IMM GRANULOCYTES # BLD AUTO: 0.02 K/UL (ref 0–0.04)
IMM GRANULOCYTES NFR BLD AUTO: 0.3 % (ref 0–0.5)
LYMPHOCYTES # BLD AUTO: 3.39 K/UL (ref 1–4.8)
MCH RBC QN AUTO: 30.2 PG (ref 27–31)
MCHC RBC AUTO-ENTMCNC: 32.5 G/DL (ref 32–36)
MCV RBC AUTO: 93 FL (ref 82–98)
MICROALBUMIN UR-MCNC: 30 UG/ML (ref ?–5000)
NUCLEATED RBC (/100WBC) (OHS): 0 /100 WBC
PLATELET # BLD AUTO: 294 K/UL (ref 150–450)
PMV BLD AUTO: 11.4 FL (ref 9.2–12.9)
POTASSIUM SERPL-SCNC: 3.8 MMOL/L (ref 3.5–5.1)
PROT SERPL-MCNC: 7.7 GM/DL (ref 6–8.4)
PSA SERPL-MCNC: 0.98 NG/ML
RBC # BLD AUTO: 4.57 M/UL (ref 4.6–6.2)
RELATIVE EOSINOPHIL (OHS): 1 %
RELATIVE LYMPHOCYTE (OHS): 46.2 % (ref 18–48)
RELATIVE MONOCYTE (OHS): 6.5 % (ref 4–15)
RELATIVE NEUTROPHIL (OHS): 45.6 % (ref 38–73)
SODIUM SERPL-SCNC: 132 MMOL/L (ref 136–145)
T4 FREE SERPL-MCNC: 0.91 NG/DL (ref 0.71–1.51)
TSH SERPL-ACNC: 1.01 UIU/ML (ref 0.4–4)
WBC # BLD AUTO: 7.33 K/UL (ref 3.9–12.7)

## 2025-06-07 PROCEDURE — 82570 ASSAY OF URINE CREATININE: CPT

## 2025-06-07 PROCEDURE — 80053 COMPREHEN METABOLIC PANEL: CPT

## 2025-06-07 PROCEDURE — 84439 ASSAY OF FREE THYROXINE: CPT

## 2025-06-07 PROCEDURE — 85025 COMPLETE CBC W/AUTO DIFF WBC: CPT

## 2025-06-07 PROCEDURE — 36415 COLL VENOUS BLD VENIPUNCTURE: CPT

## 2025-06-07 PROCEDURE — 84443 ASSAY THYROID STIM HORMONE: CPT

## 2025-06-07 PROCEDURE — 83036 HEMOGLOBIN GLYCOSYLATED A1C: CPT

## 2025-06-07 PROCEDURE — 84153 ASSAY OF PSA TOTAL: CPT

## 2025-06-10 ENCOUNTER — HOSPITAL ENCOUNTER (OUTPATIENT)
Dept: RADIOLOGY | Facility: OTHER | Age: 45
Discharge: HOME | End: 2025-06-10
Attending: FAMILY MEDICINE
Payer: COMMERCIAL

## 2025-06-10 PROCEDURE — 74178 CT ABD&PLV WO CNTR FLWD CNTR: CPT | Mod: 26,,, | Performed by: RADIOLOGY

## 2025-06-10 PROCEDURE — 25500020 PHARM REV CODE 255: Performed by: FAMILY MEDICINE

## 2025-06-10 PROCEDURE — 74178 CT ABD&PLV WO CNTR FLWD CNTR: CPT | Mod: TC

## 2025-06-10 RX ADMIN — IOHEXOL 100 ML: 350 INJECTION, SOLUTION INTRAVENOUS at 03:06

## 2025-06-11 ENCOUNTER — RESULTS FOLLOW-UP (OUTPATIENT)
Dept: ENDOCRINOLOGY | Facility: CLINIC | Age: 45
End: 2025-06-11

## 2025-06-11 ENCOUNTER — PATIENT MESSAGE (OUTPATIENT)
Dept: DIABETES | Facility: CLINIC | Age: 45
End: 2025-06-11
Payer: COMMERCIAL

## 2025-06-11 ENCOUNTER — TELEPHONE (OUTPATIENT)
Dept: DIABETES | Facility: CLINIC | Age: 45
End: 2025-06-11
Payer: COMMERCIAL

## 2025-06-11 NOTE — TELEPHONE ENCOUNTER
Patient called to schedule DM education visit. LVM for patient to return call. Portal message sent requesting appointment to be scheduled

## 2025-06-17 DIAGNOSIS — N52.9 ERECTILE DYSFUNCTION, UNSPECIFIED ERECTILE DYSFUNCTION TYPE: ICD-10-CM

## 2025-06-18 RX ORDER — SILDENAFIL 100 MG/1
100 TABLET, FILM COATED ORAL DAILY PRN
Qty: 18 TABLET | Refills: 2 | Status: SHIPPED | OUTPATIENT
Start: 2025-06-18

## 2025-06-18 NOTE — TELEPHONE ENCOUNTER
Refill Decision Note   Jacques Jocelyn  is requesting a refill authorization.  Brief Assessment and Rationale for Refill:  Approve     Medication Therapy Plan:        Pharmacist review requested: Yes   Extended chart review required: Yes   Comments:     Note composed:8:43 AM 06/18/2025           
No care due was identified.  [facility] Embedded Care Due Messages. Reference number: [id number].   6/17/2025 6:51:00 PM CDT  
Refill Routing Note   Medication(s) are not appropriate for processing by [facility] for the following reason(s):        Drug-disease interactionDrug-Disease: sildenafiL and Hepatic steatosis (no prev provider override found)    [facility] action(s):  Defer        Medication Therapy Plan: Drug-Disease: sildenafiL and Hepatic steatosis    Pharmacist review requested: Yes     Appointments  past 12m or future 3m with PCP    Date Provider   Last Visit   2/13/2025 David Ignacio MD   Next Visit   8/15/2025 David Ignacio MD   ED visits in past 90 days: 0        Note composed:8:34 AM 06/18/2025          
<-- Click to add NO significant Past Surgical History

## 2025-06-30 DIAGNOSIS — I10 HYPERTENSION, UNSPECIFIED TYPE: Primary | ICD-10-CM

## 2025-06-30 RX ORDER — LISINOPRIL 40 MG/1
40 TABLET ORAL DAILY
Qty: 90 TABLET | Refills: 3 | Status: SHIPPED | OUTPATIENT
Start: 2025-06-30

## 2025-07-02 ENCOUNTER — OFFICE VISIT (OUTPATIENT)
Dept: ENDOCRINOLOGY | Facility: CLINIC | Age: 45
End: 2025-07-02
Payer: COMMERCIAL

## 2025-07-02 ENCOUNTER — LAB VISIT (OUTPATIENT)
Dept: LAB | Facility: HOSPITAL | Age: 45
End: 2025-07-02
Payer: COMMERCIAL

## 2025-07-02 VITALS
DIASTOLIC BLOOD PRESSURE: 70 MMHG | SYSTOLIC BLOOD PRESSURE: 146 MMHG | HEART RATE: 98 BPM | OXYGEN SATURATION: 96 % | WEIGHT: 223.44 LBS | HEIGHT: 70 IN | BODY MASS INDEX: 31.99 KG/M2

## 2025-07-02 DIAGNOSIS — E27.9 ADRENAL NODULE: ICD-10-CM

## 2025-07-02 DIAGNOSIS — E11.9 TYPE 2 DIABETES MELLITUS WITHOUT COMPLICATION, WITH LONG-TERM CURRENT USE OF INSULIN: Primary | ICD-10-CM

## 2025-07-02 DIAGNOSIS — Z79.4 TYPE 2 DIABETES MELLITUS WITHOUT COMPLICATION, WITH LONG-TERM CURRENT USE OF INSULIN: Primary | ICD-10-CM

## 2025-07-02 DIAGNOSIS — E66.811 CLASS 1 OBESITY DUE TO EXCESS CALORIES WITH SERIOUS COMORBIDITY AND BODY MASS INDEX (BMI) OF 31.0 TO 31.9 IN ADULT: Chronic | ICD-10-CM

## 2025-07-02 DIAGNOSIS — E66.09 CLASS 1 OBESITY DUE TO EXCESS CALORIES WITH SERIOUS COMORBIDITY AND BODY MASS INDEX (BMI) OF 31.0 TO 31.9 IN ADULT: Chronic | ICD-10-CM

## 2025-07-02 DIAGNOSIS — K59.00 CONSTIPATION, UNSPECIFIED CONSTIPATION TYPE: ICD-10-CM

## 2025-07-02 LAB
ANION GAP (OHS): 9 MMOL/L (ref 8–16)
BUN SERPL-MCNC: 12 MG/DL (ref 6–20)
CALCIUM SERPL-MCNC: 9 MG/DL (ref 8.7–10.5)
CHLORIDE SERPL-SCNC: 107 MMOL/L (ref 95–110)
CO2 SERPL-SCNC: 25 MMOL/L (ref 23–29)
CREAT SERPL-MCNC: 0.9 MG/DL (ref 0.5–1.4)
GFR SERPLBLD CREATININE-BSD FMLA CKD-EPI: >60 ML/MIN/1.73/M2
GLUCOSE SERPL-MCNC: 127 MG/DL (ref 70–110)
POTASSIUM SERPL-SCNC: 3.9 MMOL/L (ref 3.5–5.1)
SODIUM SERPL-SCNC: 141 MMOL/L (ref 136–145)

## 2025-07-02 PROCEDURE — 82374 ASSAY BLOOD CARBON DIOXIDE: CPT

## 2025-07-02 PROCEDURE — 82088 ASSAY OF ALDOSTERONE: CPT

## 2025-07-02 PROCEDURE — 36415 COLL VENOUS BLD VENIPUNCTURE: CPT

## 2025-07-02 PROCEDURE — 84244 ASSAY OF RENIN: CPT

## 2025-07-02 PROCEDURE — 99999 PR PBB SHADOW E&M-EST. PATIENT-LVL IV: CPT | Mod: PBBFAC,,,

## 2025-07-02 RX ORDER — BLOOD-GLUCOSE SENSOR
EACH MISCELLANEOUS
Qty: 2 EACH | Refills: 11 | Status: SHIPPED | OUTPATIENT
Start: 2025-07-02

## 2025-07-02 RX ORDER — TIRZEPATIDE 5 MG/.5ML
5 INJECTION, SOLUTION SUBCUTANEOUS
Qty: 2 ML | Refills: 3 | Status: SHIPPED | OUTPATIENT
Start: 2025-07-02

## 2025-07-02 NOTE — PROGRESS NOTES
[facility]  Endocrinology Clinic  Pharmacist Note    I saw this patient today following the plan of care established by Dr. Meyer. Supervision in clinic during the encounter was performed by Dr. Meyer.    Today's Visit Date: 7/2/2025    Subjective:     Chief Complaint: Diabetes    HPI:  Jacques Banks is a 44 y.o. male with T2DM complicated by neuropathy, maybe some retinopathy in the past, DKA in 11/2023 presenting for follow-up DM medication management.  Other PMHx includes:  Past Medical History:   Diagnosis Date    Acute pancreatitis 11/28/2023    Diabetes mellitus type I     DKA, type 2, not at goal 11/25/2023    Hyperlipidemia     Ileus 11/28/2023    Intra-abdominal infection 11/25/2023     Last endocrinology visit: Dr. Meyer 6/4/2025  - No data at visit but he notes persistent elevation in glucose in setting of poor diet choices and unclear consistency with insulin  - Discontinued Metformin due to reported GI side effects and ineffectiveness.  - Initiated Mounjaro at a low dose, once weekly injection  - Long discussion about history of pancreatitis. Limited records available but he is very insistent that he has not taken Trulicity. Reviewed risk vs benefit and we agree potential benefit outweighs risk   - Continued Lantus (long-acting insulin) at 20 units.  - Continued Humalog (fast-acting insulin) at 6 units with meals.  - Patient to wear Symone sensor consistently before next appointment  - Referred to diabetes educator Neris for guidance on making healthier meal choices when eating out, especially at fast food restaurants.    Today reports:  - Tolerating Mounjaro well, some constipation and decreased appetite  - Was able to wear Symone for about 5 days but fell off due to lots of sweating at work  - Hasn't been SMBG, has been out of town for work   - Injects insulin into upper thighs, rotates site with each injection, changes needle with each injection    Medical therapy:  Current  "diabetic medications include:   - Lantus 20 units QAM  - Humalog 8 units with meals  - Mounjaro 2.5 mg weekly    Other medications tried:  Metformin -- GI upset  Tresiba    Review of Systems  Endocrine: No Polyuria polydipsia nocturia or vision changes    Objective:     Physical Exam    Vitals:    07/02/25 0959   BP: (!) 146/70   BP Location: Right arm   Patient Position: Sitting   Pulse: 98   SpO2: 96%   Weight: 101.4 kg (223 lb 7 oz)   Height: 5' 10" (1.778 m)       Most recent a1c was 8.3 on 06/07/2025    Lab Results   Component Value Date    HGBA1C 8.3 (H) 06/07/2025    HGBA1C 8.0 (H) 06/04/2025    HGBA1C 9.0 (H) 02/01/2025    HGBA1C 10.6 (H) 09/04/2024    HGBA1C 10.5 (H) 11/24/2023     Lab Results   Component Value Date    CPEPTIDE 1.81 09/04/2024    GLUTAMICACID 0.00 09/04/2024     Current diet:   Loves fruit- grapes, grapefruit, cherries, watermelon  Breakfast: eggs, maybe potatoes  Lunch: doesn't typically eat in the middle of the day, sometimes a lunchable with apples and carrots  Dinner: busy with traveling for work- eats out a lot [employer], [employer]  Snacks: fruit, chips, pickles, cookie sometimes  Drinks: water, 1 powerade per day    Current exercise: very physical job    Lifestyle:  Last visit with Diabetes Educator: : 01/31/2025    Assessment and Plan     Type 2 diabetes mellitus without complication, with long-term current use of insulin  - Patient diagnosed with T2DM around 2016  - No data at today's visit but noted although still persistent elevation in glucose in setting of poor diet choices, noticing some improvement with the initiation of GLP-1 and more consistency with insulin  - Tolerating Mounjaro well, will titrate up to 5 mg once weekly  - Encouraged to re-apply Freestyle Symone and recommended mastisol to help it stay on. If still having issues, consider Eversense  - If unable to use CGM still, encouraged patient to SMBG via fingerstick  - Recommend making healthier meal choices when eating " out, especially during work-related travel  - Has follow-up visit scheduled with Dr. Meyer 10/2025    Adrenal nodule  - CT scan showed benign nodule in left adrenal gland  - Renin and sidney ordered. Will discuss further at next visit with Dr. Meyer    Class 1 obesity with body mass index (BMI) of 31.0 to 31.9 in adult  - Continue GLP-1 as above    Constipation  - Mild constipation in the setting of GLP-1 therapy  - Recommended OTC options such as Miralax and/or stool softeners PRN    Reviewed goals of therapy -- to get the best control we can without hypoglycemia  Reviewed patient's current insulin regimen. Clarified proper insulin dose and timing in relation to meals, etc. Insulin injection sites and proper rotation instructed.  Advised frequent self-monitoring of blood glucose.  Patient encouraged to document glucose results and bring them to every clinic visit.   Hypoglycemia precautions discussed. Instructed on precautions before driving.    Call clinic or message me for BG repeatedly < 90 or > 180.   Close adherence to lifestyle changes recommended.   Periodic follow-up visits for eye evaluations, foot care, and dental care suggested.  Encouraged exercise per ADA guidelines of at least 150 minutes weekly.   Encouraged DM diet and low carb snacks and will give written information. Encouraged to  proteins with carbs, encouraged ¼ cup of carbs per meal and 30-45 grams of carbs per meal, eat 3 meals daily, and consume Glucerna or a protein bar if skipping a meal.    Leonila Matthew, PharmD  Clinical Pharmacist - Endocrinology    I have reviewed and concur with Leonila Matthew's history, physical, assessment, and plan.  I have personally interviewed and examined the patient and reviewed PharmD note regarding medication adjustments. These have been incorporated into my treatment plan     T2DM  Now on Mounjaro but no glucose data to review at visit  Encouraged him to resume Symone and discussed strategies to keep in  place. If that is not successful would try Eversense    Adrenal adenoma: Check renin/sidney and then will obtain dexamethasone suppression test       Carrol Meyer MD

## 2025-07-02 NOTE — ASSESSMENT & PLAN NOTE
- CT scan showed benign nodule in left adrenal gland  - Renin and sidney ordered. Will discuss further at next visit with Dr. Meyer

## 2025-07-02 NOTE — ASSESSMENT & PLAN NOTE
- Patient diagnosed with T2DM around 2016  - No data at today's visit but noted although still persistent elevation in glucose in setting of poor diet choices, noticing some improvement with the initiation of GLP-1 and more consistency with insulin  - Tolerating Mounjaro well, will titrate up to 5 mg once weekly  - Encouraged to re-apply Freestyle Symone and recommended mastisol to help it stay on. If still having issues, consider Eversense  - If unable to use CGM still, encouraged patient to SMBG via fingerstick  - Recommend making healthier meal choices when eating out, especially during work-related travel  - Has follow-up visit scheduled with Dr. Meyer 10/2025

## 2025-07-02 NOTE — ASSESSMENT & PLAN NOTE
- Mild constipation in the setting of GLP-1 therapy  - Recommended OTC options such as Miralax and/or stool softeners PRN

## 2025-07-02 NOTE — PATIENT INSTRUCTIONS
- Increase Mounjaro to 5 mg once weekly  - Continue Lantus and Humalog  - Make sure to check blood sugars! If your Symone falls off, you'll have to check your blood sugar with a fingerstick    Put Symone sensor on- here's a link for mastisol to help keep the Symone on your arm. Put it on before you put on the sensor:   https://www.[employer]/Mastisol-Liquid-Adhesive-2-oz/dp/V43II2CLTH?[email or web address]    For constipation, I recommend improving fluid intake, avoiding foods that worsen constipation like dairy and processed foods, and increasing fiber intake with supplements if needed. If additional medications are needed, I recommend starting with constipation relief medications you have had success with previously. I recommend trying Miralax if there are challenges producing a bowel movement and/or stool softeners such as Colace if there are troubles passing stool.

## 2025-07-06 ENCOUNTER — PATIENT MESSAGE (OUTPATIENT)
Dept: ENDOCRINOLOGY | Facility: CLINIC | Age: 45
End: 2025-07-06
Payer: COMMERCIAL

## 2025-07-08 LAB — RENIN PLAS-CCNC: 2.1 NG/ML/H

## 2025-08-28 DIAGNOSIS — Z79.4 TYPE 2 DIABETES MELLITUS WITHOUT COMPLICATION, WITH LONG-TERM CURRENT USE OF INSULIN: ICD-10-CM

## 2025-08-28 DIAGNOSIS — E11.9 TYPE 2 DIABETES MELLITUS WITHOUT COMPLICATION, WITH LONG-TERM CURRENT USE OF INSULIN: ICD-10-CM

## 2025-08-28 RX ORDER — PEN NEEDLE, DIABETIC 30 GX3/16"
1 NEEDLE, DISPOSABLE MISCELLANEOUS 4 TIMES DAILY
Qty: 200 EACH | Refills: 11 | Status: SHIPPED | OUTPATIENT
Start: 2025-08-28

## 2025-08-28 RX ORDER — INSULIN GLARGINE 100 [IU]/ML
20 INJECTION, SOLUTION SUBCUTANEOUS NIGHTLY
Qty: 18 ML | Refills: 3 | Status: SHIPPED | OUTPATIENT
Start: 2025-08-28